# Patient Record
Sex: MALE | Race: WHITE | URBAN - METROPOLITAN AREA
[De-identification: names, ages, dates, MRNs, and addresses within clinical notes are randomized per-mention and may not be internally consistent; named-entity substitution may affect disease eponyms.]

---

## 2017-08-23 ENCOUNTER — OFFICE VISIT (OUTPATIENT)
Dept: FAMILY MEDICINE CLINIC | Age: 37
End: 2017-08-23

## 2017-08-23 VITALS
HEART RATE: 96 BPM | BODY MASS INDEX: 35.8 KG/M2 | TEMPERATURE: 98.2 F | DIASTOLIC BLOOD PRESSURE: 80 MMHG | RESPIRATION RATE: 18 BRPM | SYSTOLIC BLOOD PRESSURE: 127 MMHG | OXYGEN SATURATION: 96 % | WEIGHT: 294 LBS | HEIGHT: 76 IN

## 2017-08-23 DIAGNOSIS — E66.9 OBESITY (BMI 35.0-39.9 WITHOUT COMORBIDITY): ICD-10-CM

## 2017-08-23 DIAGNOSIS — G47.00 INSOMNIA, UNSPECIFIED TYPE: ICD-10-CM

## 2017-08-23 DIAGNOSIS — M62.838 CERVICAL PARASPINAL MUSCLE SPASM: ICD-10-CM

## 2017-08-23 DIAGNOSIS — M62.838 MUSCLE SPASM OF LEFT SHOULDER: ICD-10-CM

## 2017-08-23 DIAGNOSIS — S46.912A MUSCLE STRAIN, SHOULDER REGION, LEFT, INITIAL ENCOUNTER: Primary | ICD-10-CM

## 2017-08-23 DIAGNOSIS — E78.2 ELEVATED TRIGLYCERIDES WITH HIGH CHOLESTEROL: ICD-10-CM

## 2017-08-23 PROBLEM — G43.909 MIGRAINE WITHOUT STATUS MIGRAINOSUS, NOT INTRACTABLE: Status: ACTIVE | Noted: 2017-08-23

## 2017-08-23 RX ORDER — IBUPROFEN 200 MG
1000 TABLET ORAL
COMMUNITY
End: 2017-08-23

## 2017-08-23 RX ORDER — KETOROLAC TROMETHAMINE 30 MG/ML
30 INJECTION, SOLUTION INTRAMUSCULAR; INTRAVENOUS ONCE
Qty: 1 ML | Refills: 0 | Status: SHIPPED | COMMUNITY
Start: 2017-08-23 | End: 2017-08-23

## 2017-08-23 RX ORDER — CYCLOBENZAPRINE HCL 10 MG
TABLET ORAL
Qty: 20 TAB | Refills: 0 | Status: SHIPPED | OUTPATIENT
Start: 2017-08-23

## 2017-08-23 RX ORDER — IBUPROFEN 800 MG/1
TABLET ORAL
Qty: 90 TAB | Refills: 0 | Status: SHIPPED | OUTPATIENT
Start: 2017-08-23

## 2017-08-23 NOTE — MR AVS SNAPSHOT
Visit Information Date & Time Provider Department Dept. Phone Encounter #  
 8/23/2017  3:15 PM Brittany Campos MD [address] [facility] [phone] [id number] Upcoming Health Maintenance Date Due DTaP/Tdap/Td series (1 - Tdap) 5/21/2001 INFLUENZA AGE 9 TO ADULT 8/1/2017 Allergies as of 8/23/2017  Review Complete On: 8/23/2017 By: Brittany Campos MD  
  
 Severity Noted Reaction Type Reactions Fenofibrate  08/23/2017   Intolerance Other (comments) Low energy after 2-3 weeks Current Immunizations  Never Reviewed No immunizations on file. Not reviewed this visit You Were Diagnosed With   
  
 Codes Comments Muscle strain, shoulder region, left, initial encounter    -  Primary ICD-10-CM: P51.777H ICD-9-CM: 840.9 Muscle spasm of left shoulder     ICD-10-CM: E24.695 ICD-9-CM: 728.85 Cervical paraspinal muscle spasm     ICD-10-CM: D13.197 ICD-9-CM: 728.85 Elevated triglycerides with high cholesterol     ICD-10-CM: E78.2 ICD-9-CM: 272.2 Insomnia, unspecified type     ICD-10-CM: G47.00 ICD-9-CM: 780.52 Obesity (BMI 35.0-39.9 without comorbidity)     ICD-10-CM: J37.2 ICD-9-CM: 278.00 Vitals BP Pulse Temp Resp Height(growth percentile) Weight(growth percentile) 127/80 96 98.2 °F (36.8 °C) 18 6' 4\" (1.93 m) 294 lb (133.4 kg) SpO2 BMI Smoking Status 96% 35.79 kg/m2 Former Smoker Vitals History BMI and BSA Data Body Mass Index Body Surface Area 35.79 kg/m 2 2.67 m 2 Preferred Pharmacy Pharmacy Name Phone [facility] [phone], [address] [phone] Your Updated Medication List  
  
   
This list is accurate as of: 8/23/17  4:05 PM.  Always use your most recent med list.  
  
  
  
  
 cyclobenzaprine 10 mg tablet Commonly known as:  FLEXERIL  
 1/2 tab to 1 tab twice a day as needed for muscle spasms  
  
 ibuprofen 800 mg tablet Commonly known as:  MOTRIN  
1 tab PO every 8 hrs with food for 7 days then take 1 tab PO every 8 hrs as needed  
  
 ketorolac tromethamine 60 mg/2 mL Soln Commonly known as:  TORADOL  
1 mL by IntraMUSCular route once for 1 dose. Prescriptions Sent to Pharmacy Refills  
 cyclobenzaprine (FLEXERIL) 10 mg tablet 0 Si/2 tab to 1 tab twice a day as needed for muscle spasms Class: Normal  
 Pharmacy: [facility] Ph #: [phone]  
 ibuprofen (MOTRIN) 800 mg tablet 0 Si tab PO every 8 hrs with food for 7 days then take 1 tab PO every 8 hrs as needed Class: Normal  
 Pharmacy: [facility] [address] [place] [address] Rd, [address] Ph #: [phone] We Performed the Following KETOROLAC TROMETHAMINE INJ [ [facility]] NE THER/PROPH/DIAG INJECTION, SUBCUT/IM [id number] CPT(R)] To-Do List   
 2017 Lab:  HEMOGLOBIN A1C WITH EAG   
  
 2017 Lab:  HGB & HCT   
  
 2017 Lab:  LIPID PANEL   
  
 2017 Lab:  METABOLIC PANEL, COMPREHENSIVE Patient Instructions Take ibuprofen and flexeril as prescribed Do not drive drowsy or mix flexeril with alcohol or other sedatives Take ibuprofen with food every time, start tomorrow Do shoulder and neck exercises as often as possible daily If this is not helpful in the next 6-8 weeks we can refer you to physical therapy For sleep, non caffiene after noon each day Consider stretching at night Return to give blood work after fasting at least 8 hrs, water is okay Lab opens at 8:30 AM 
We are rechecking your cholesterol and other basic blood test 
We will let you know the results of your blood and urine test when they come in. We will call if abnormal and send a letter if normal. 
 
Neck Spasm: Exercises Your Care Instructions Here are some examples of typical rehabilitation exercises for your condition. Start each exercise slowly. Ease off the exercise if you start to have pain. Your doctor or physical therapist will tell you when you can start these exercises and which ones will work best for you. How to do the exercises Levator scapula stretch 1. Sit in a firm chair, or stand up straight. 2. Gently tilt your head toward your left shoulder. 3. Turn your head to look down into your armpit, bending your head slightly forward. Let the weight of your head stretch your neck muscles. 4. Hold for 15 to 30 seconds. 5. Return to your starting position. 6. Follow the same instructions above, but tilt your head toward your right shoulder. 7. Repeat 2 to 4 times toward each shoulder. Upper trapezius stretch 1. Sit in a firm chair, or stand up straight. 2. This stretch works best if you keep your shoulder down as you lean away from it. To help you remember to do this, start by relaxing your shoulders and lightly holding on to your thighs or your chair. 3. Tilt your head toward your shoulder and hold for 15 to 30 seconds. Let the weight of your head stretch your muscles. 4. If you would like a little added stretch, place your arm behind your back. Use the arm opposite of the direction you are tilting your head. For example, if you are tilting your head to the left, place your right arm behind your back. 5. Repeat 2 to 4 times toward each shoulder. Neck rotation 1. Sit in a firm chair, or stand up straight. 2. Keeping your chin level, turn your head to the right, and hold for 15 to 30 seconds. 3. Turn your head to the left, and hold for 15 to 30 seconds. 4. Repeat 2 to 4 times to each side. Chin tuck 1. Lie on the floor with a rolled-up towel under your neck. Your head should be touching the floor. 2. Slowly bring your chin toward the front of your neck. 3. Hold for a count of 6, and then relax for up to 10 seconds. 4. Repeat 8 to 12 times. Forward neck flexion 1. Sit in a firm chair, or stand up straight. 2. Bend your head forward. 3. Hold for 15 to 30 seconds, then return to your starting position. 4. Repeat 2 to 4 times. Follow-up care is a key part of your treatment and safety. Be sure to make and go to all appointments, and call your doctor if you are having problems. It's also a good idea to know your test results and keep a list of the medicines you take. Where can you learn more? Go to http://jessica-demond.info/. Enter P962 in the search box to learn more about \"Neck Spasm: Exercises. \" Current as of: March 21, 2017 Content Version: 11.3 © 2345-7535 Spodly. Care instructions adapted under license by [employer]pt[employer] (which disclaims liability or warranty for this information). If you have questions about a medical condition or this instruction, always ask your healthcare professional. [place] [address] any warranty or liability for your use of this information. Shoulder Blade: Exercises Your Care Instructions Here are some examples of typical exercises for your condition. Start each exercise slowly. Ease off the exercise if you start to have pain. Your doctor or physical therapist will tell you when you can start these exercises and which ones will work best for you. How to do the exercises Shoulder roll 8. Stand tall with your chin slightly tucked. Imagine that a string at the top of your head is pulling you straight up. 9. Keep your arms relaxed. All motion will be in your shoulders. 10. Shrug your shoulders up toward your ears, then up and back. [place] your shoulders down and back, like you're sliding your hands down into your back pants pockets. 11. Repeat the circles at least 2 to 4 times. This exercise is also helpful anytime you want to relax. Lower neck and upper back stretch 6. With your arms about shoulder height, clasp your hands in front of you. 7. Drop your chin toward your chest. 
8. Reach straight forward so you are rounding your upper back. Think about pulling your shoulder blades apart. Javier Orozco feel a stretch across your upper back and shoulders. Hold for at least 6 seconds. 9. Repeat 2 to 4 times. Triceps stretch 5. Reach your arm straight up. 6. Keeping your elbow in place, bend your arm and reach your hand down behind your back. 7. With your other hand, apply gentle pressure to the bent elbow. Willouis Orozco feel a stretch at the back of your upper arm and shoulder. Hold about 6 seconds. 8. Repeat 2 to 4 times with each arm. Shoulder stretch 5. Relax your shoulders. 6. Raise one arm to shoulder height, and reach it across your chest. 
7. Pull the arm slightly toward you with your other arm. This will help you get a gentle stretch. Hold for about 6 seconds. 8. Repeat 2 to 4 times. Shoulder blade squeeze 5. Sit or stand up tall with your arms at your sides. 6. Keep your shoulders relaxed and down, not shrugged. 7. Squeeze your shoulder blades together. Hold for 6 seconds, then relax. 8. Repeat 8 to 12 times. Straight-arm shoulder blade squeeze 1. Sit or stand tall. Relax your shoulders. 2. With palms down, hold your elastic tubing or band straight out in front of you. 3. Start with slight tension in the tubing or band, with your hands about shoulder-width apart. 4. Slowly pull straight out to the sides, squeezing your shoulder blades together. Keep your arms straight and at shoulder height. Slowly release. 5. Repeat 8 to 12 times. Rowing 1. [place] your elastic tubing or band at about waist height. Take one end in each hand. 2. Sit or stand with your feet hip-width apart. 3. Hold your arms straight in front of you.  Adjust your distance to create slight tension in the tubing or band. 4. Slightly tuck your chin. Relax your shoulders. 5. Without shrugging your shoulders, pull straight back. Your elbows will pass alongside your waist. 
Pull-downs 1. [place] your elastic tubing or band in the top of a closed door. Take one end in each hand. 2. Either sit or stand, depending on what is more comfortable. If you feel unsteady, sit on a chair. 3. Start with your arms up and comfortably apart, elbows straight. There should be a slight tension in the tubing or band. 4. Slightly tuck your chin, and look straight ahead. 5. Keeping your back straight, slowly pull down and back, bending your elbows. 6. Stop where your hands are level with your chin, in a \"goalpost\" position. 7. Repeat 8 to 12 times. Chest T stretch 1. Lie on your back. Raise your knees so they are bent. Plant your feet on the floor, hip-width apart. 2. Tuck your chin, and relax your shoulders. 3. Reach your arms straight out to the sides. If you don't feel a mild stretch in your shoulders and across your chest, use a foam roll or a tightly rolled blanket under your spine, from your tailbone to your head. 4. Relax in this position for at least 15 to 30 seconds while you breathe normally. Repeat 2 to 4 times. As you get used to this stretch, keep adding a little more time until you are able relax in this position for 2 or 3 minutes. When you can relax for at least 2 minutes, you only need to do the exercise 1 time per session. Chest goalpost stretch 1. Lie on your back. Raise your knees so they are bent. Plant your feet on the floor, hip-width apart. 2. Tuck your chin, and relax your shoulders. 3. Reach your arms straight out to the sides. 4. Bend your arms at the elbows, with your hands pointed toward the top of your head. Your arms should make an L on either side of your head. Your palms should be facing up.  
5. If you don't feel a mild stretch in your shoulders and across your chest, use a foam roll or tightly rolled blanket under your spine, from your tailbone to your head. 6. Relax in this position for at least 15 to 30 seconds while you breathe normally. Repeat 2 to 4 times. Each day you do this exercise, add a little more time until you can relax in this position for 2 or 3 minutes. When you can relax for at least 2 minutes, you only need to do the exercise 1 time per session. Follow-up care is a key part of your treatment and safety. Be sure to make and go to all appointments, and call your doctor if you are having problems. It's also a good idea to know your test results and keep a list of the medicines you take. Where can you learn more? Go to http://jessica-demond.info/. Enter ([phone] [phone] in the search box to learn more about \"Shoulder Blade: Exercises. \" Current as of: March 21, 2017 Content Version: 11.3 © 8313-1054 Emote Games. Care instructions adapted under license by Embedly (which disclaims liability or warranty for this information). If you have questions about a medical condition or this instruction, always ask your healthcare professional. [address] any warranty or liability for your use of this information. Introducing [facility]! Kamilah Garza introduces [email or web address] patient portal. Now you can access parts of your medical record, email your doctor's office, and request medication refills online. 1. In your internet browser, go to [email or web address] 2. Click on the First Time User? Click Here link in the Sign In box. You will see the New Member Sign Up page. 3. Enter your [email or web address] Access Code exactly as it appears below. You will not need to use this code after youve completed the sign-up process. If you do not sign up before the expiration date, you must request a new code. · [email or web address] Access Code: [email or web address] Expires: 11/21/2017  3:14 PM 
 
 4. Enter the last four digits of your Social Security Number (xxxx) and Date of Birth (mm/dd/yyyy) as indicated and click Submit. You will be taken to the next sign-up page. 5. Create a Peekapak ID. This will be your Peekapak login ID and cannot be changed, so think of one that is secure and easy to remember. 6. Create a Peekapak password. You can change your password at any time. 7. Enter your Password Reset Question and Answer. This can be used at a later time if you forget your password. 8. Enter your e-mail address. You will receive e-mail notification when new information is available in [address] 9. Click Sign Up. You can now view and download portions of your medical record. 10. Click the Download Summary menu link to download a portable copy of your medical information. If you have questions, please visit the Frequently Asked Questions section of the Peekapak website. Remember, Peekapak is NOT to be used for urgent needs. For medical emergencies, dial 911. Now available from your iPhone and Android! Please provide this summary of care documentation to your next provider. If you have any questions after today's visit, please call [phone].

## 2017-08-23 NOTE — PROGRESS NOTES
INTERNAL MEDICINE INITIAL PROVIDER VISIT    SUBJECTIVE:     Chief Complaint   Patient presents with    Shoulder Pain     left shoulder pain times 2 and a half months. Patients states Jayden Rebolledo is a  and was pulling on a breaker bar and hurt his shoulder\".  Establish Care       HPI: 40 y.o. male is here for the above chief complaint(s). Establish Care: No prior PCP in years. Left Shoulder Pain: Onset 2 months ago while working as  pulling and had sharp/tightness pain near shoulder blade/superior shoulder and neck. Worse with movement. Better with ibuprofen. Doing lighter work at [address] hot patches somewhat help. No weakness, numbness or tingling coming down arm. No similar pains. No previous work up. [employer] in [employer]. Migraines: Brings up at end of visit. On average 2x per month. Encouraged to schedule appointment to address. Pt voiced understanding. ROS:  · General: negative for - chills, fever, weight changes or malaise, night sweats. · HEENT: negative for- sore throat, nasal congestion,  vision problems or ear problems. H/o sinus congestion, not currently. · Neck: negative for- neck swelling, trouble swallowing, lymph node enlargement. · Respiratory: negative for- cough, shortness of breath, or wheezing. · Cardiovascular: negative for- chest pain, palpitations, or dyspnea on exertion; no orthopnea or PND. · Gastrointestinal: negative for- abdominal pain, N/V, change in bowel habits,  black or bloody stools, constipation or diarrhea. · Genitourinary: negative for- dysuria, hematuria, frequency, urgency, nocturia, incontinence. · Skin: negative for- new rashes or lesions. Positive for Skin tags. · Hematology: negative for- easy bruising or bleeding. · Musculoskeletal: As per HPI. Occasional Low back pain  · Neurological: negative for- numbness, tingling, headache or dizziness. Positive h/o migraines.    · Psychological: negative for - anxiety, depression, sleep disturbances, suicidal or homicidal ideations. Trouble falling asleep, shutting brain off at night. Current Outpatient Prescriptions   Medication Sig    cyclobenzaprine (FLEXERIL) 10 mg tablet 1/2 tab to 1 tab twice a day as needed for muscle spasms    ibuprofen (MOTRIN) 800 mg tablet 1 tab PO every 8 hrs with food for 7 days then take 1 tab PO every 8 hrs as needed    ketorolac tromethamine (TORADOL) 60 mg/2 mL soln 1 mL by IntraMUSCular route once for 1 dose. No current facility-administered medications for this visit. Health Maintenance   Topic Date Due    DTaP/Tdap/Td series (1 - Tdap) 05/21/2001    INFLUENZA AGE 9 TO ADULT  08/01/2017       Medications and Allergies: Reviewed and confirmed in the chart    Past Medical Hx: Reviewed and confirmed in the chart  Past Medical History:   Diagnosis Date    Elevated triglycerides with high cholesterol 2820-6120    500-600    Insomnia     years, trouble falling asleep, related to ruminations, trouble staying asleep    Migraines     childhood, on average every 2 weeks    Obesity (BMI 35.0-39.9 without comorbidity)     Right foot pain     15 years ago stepped awkward heard a pop       Family Hx, Surgical Hx, Social Hx: Reviewed and updated in EMR    OBJECTIVE:  Vitals:    08/23/17 1500 08/23/17 1605   BP: (!) 141/91 127/80   Pulse: 96    Resp: 18    Temp: 98.2 °F (36.8 °C)    SpO2: 96%    Weight: 294 lb (133.4 kg)    Height: 6' 4\" (1.93 m)    General: Adult male in no acute distress  HEENT: Head is atraumatic normo-cephalic. Pupils equally round and reactive to light, extraocular muscle intact, conjunctiva clear, non-icterus. CVS:Heart regular, rate, and rhythm. Audible S1 and S2. No murmurs, rubs or gallops. Peripheral pulses 2+   PULM: Lungs clear to auscultation bilaterally. No wheezes, rales or rhonchi. Neuro: Alert and oriented x 3. MSE: euthymic, affect congruent. Left Shoulder and Neck : CALEB, strenght intact. Reflexes intact. Sensation intact. No TTP to bony landmarks. Negative drop arm. TTP with palpable spasms marybeth-cerivcal spine on left, medial marybeth-shoulder scapula and superior shoulder in trapezius distribution  Right Shoulder:  wnl       Nursing Notes Reviewed    ASSESSMENT AND PLAN    ICD-10-CM ICD-9-CM    1. Muscle strain, shoulder region, left, initial encounter S46.912A 840.9 cyclobenzaprine (FLEXERIL) 10 mg tablet      ibuprofen (MOTRIN) 800 mg tablet      ketorolac tromethamine (TORADOL) 60 mg/2 mL soln      KETOROLAC TROMETHAMINE INJ      MI THER/PROPH/DIAG INJECTION, SUBCUT/IM  Shoulder and neck exercise hand out given  Return precautions given   2. Muscle spasm of left shoulder M62.838 728.85 cyclobenzaprine (FLEXERIL) 10 mg tablet      ibuprofen (MOTRIN) 800 mg tablet      ketorolac tromethamine (TORADOL) 60 mg/2 mL soln      KETOROLAC TROMETHAMINE INJ      MI THER/PROPH/DIAG INJECTION, SUBCUT/IM   3. Cervical paraspinal muscle spasm M62.838 728.85 cyclobenzaprine (FLEXERIL) 10 mg tablet      ibuprofen (MOTRIN) 800 mg tablet      ketorolac tromethamine (TORADOL) 60 mg/2 mL soln      KETOROLAC TROMETHAMINE INJ      MI THER/PROPH/DIAG INJECTION, SUBCUT/IM   4. Elevated triglycerides with high cholesterol E78.2 272.2 LIPID PANEL  ALONSO signed for past MD   5. Insomnia, unspecified type G47.00 780.52 Discussed sleep hygiene, relaxation techniques  Encouraged to avoid caffeine after Noon   6.  Obesity (BMI 35.0-39.9 without comorbidity) E66.9 278.00 LIPID PANEL      METABOLIC PANEL, COMPREHENSIVE      HGB & HCT      HEMOGLOBIN A1C WITH EAG       Orders Placed This Encounter    LIPID PANEL    METABOLIC PANEL, COMPREHENSIVE    HGB & HCT    HEMOGLOBIN A1C WITH EAG    KETOROLAC TROMETHAMINE INJ (Qty 4)    THER/PROPH/DIAG INJECTION, SUBCUT/IM    DISCONTD: ibuprofen (MOTRIN) 200 mg tablet    cyclobenzaprine (FLEXERIL) 10 mg tablet    ibuprofen (MOTRIN) 800 mg tablet    ketorolac tromethamine (TORADOL) 60 mg/2 mL soln Future Appointments  Date Time Provider Lion Crocketti   10/18/2017 8:00 AM Mary Anaya MD [address] [place] [address]       AVS printed and provided to patient    Assessment and plan above discussed with patient, patient voiced understanding and agreement with plan. More than 50% of this 45 min visit was spent counseling the patient face to face about obesity, muscle spasms/strain, insomnia      Mary Anaya M.D.   [address], [address] [place] [address] [phone]  [place] [phone]

## 2017-08-23 NOTE — PROGRESS NOTES
Chief Complaint   Patient presents with    Shoulder Pain     left shoulder pain times 2 and a half months. Patients states Irina Pierce is a  and was pulling on a breaker bar and hurt his shoulder\".    [employer] Establish Care

## 2017-08-23 NOTE — PATIENT INSTRUCTIONS
Take ibuprofen and flexeril as prescribed  Do not drive drowsy or mix flexeril with alcohol or other sedatives  Take ibuprofen with food every time, start tomorrow  Do shoulder and neck exercises as often as possible daily    If this is not helpful in the next 6-8 weeks we can refer you to physical therapy     For sleep, non caffiene after noon each day  Consider stretching at night    Return to give blood work after fasting at least 8 hrs, water is okay  Lab opens at 8:30 AM  We are rechecking your cholesterol and other basic blood test  We will let you know the results of your blood and urine test when they come in. We will call if abnormal and send a letter if normal.    Neck Spasm: Exercises  Your Care Instructions  Here are some examples of typical rehabilitation exercises for your condition. Start each exercise slowly. Ease off the exercise if you start to have pain. Your doctor or physical therapist will tell you when you can start these exercises and which ones will work best for you. How to do the exercises  Levator scapula stretch    1. Sit in a firm chair, or stand up straight. 2. Gently tilt your head toward your left shoulder. 3. Turn your head to look down into your armpit, bending your head slightly forward. Let the weight of your head stretch your neck muscles. 4. Hold for 15 to 30 seconds. 5. Return to your starting position. 6. Follow the same instructions above, but tilt your head toward your right shoulder. 7. Repeat 2 to 4 times toward each shoulder. Upper trapezius stretch    1. Sit in a firm chair, or stand up straight. 2. This stretch works best if you keep your shoulder down as you lean away from it. To help you remember to do this, start by relaxing your shoulders and lightly holding on to your thighs or your chair. 3. Tilt your head toward your shoulder and hold for 15 to 30 seconds. Let the weight of your head stretch your muscles.   4. If you would like a little added stretch, place your arm behind your back. Use the arm opposite of the direction you are tilting your head. For example, if you are tilting your head to the left, place your right arm behind your back. 5. Repeat 2 to 4 times toward each shoulder. Neck rotation    1. Sit in a firm chair, or stand up straight. 2. Keeping your chin level, turn your head to the right, and hold for 15 to 30 seconds. 3. Turn your head to the left, and hold for 15 to 30 seconds. 4. Repeat 2 to 4 times to each side. Chin tuck    1. Lie on the floor with a rolled-up towel under your neck. Your head should be touching the floor. 2. Slowly bring your chin toward the front of your neck. 3. Hold for a count of 6, and then relax for up to 10 seconds. 4. Repeat 8 to 12 times. Forward neck flexion    1. Sit in a firm chair, or stand up straight. 2. Bend your head forward. 3. Hold for 15 to 30 seconds, then return to your starting position. 4. Repeat 2 to 4 times. Follow-up care is a key part of your treatment and safety. Be sure to make and go to all appointments, and call your doctor if you are having problems. It's also a good idea to know your test results and keep a list of the medicines you take. Where can you learn more? Go to http://jessica-demond.info/. Enter P962 in the search box to learn more about \"Neck Spasm: Exercises. \"  Current as of: March 21, 2017  Content Version: 11.3  © 2739-7920 [employer]. Care instructions adapted under license by [employer] (which disclaims liability or warranty for this information). If you have questions about a medical condition or this instruction, always ask your healthcare professional. [address] any warranty or liability for your use of this information. Shoulder Blade: Exercises  Your Care Instructions  Here are some examples of typical exercises for your condition. Start each exercise slowly.  Ease off the exercise if you start to have pain. Your doctor or physical therapist will tell you when you can start these exercises and which ones will work best for you. How to do the exercises  Shoulder roll    8. Stand tall with your chin slightly tucked. Imagine that a string at the top of your head is pulling you straight up. 9. Keep your arms relaxed. All motion will be in your shoulders. 10. Shrug your shoulders up toward your ears, then up and back. [employer] your shoulders down and back, like you're sliding your hands down into your back pants pockets. 11. Repeat the circles at least 2 to 4 times. This exercise is also helpful anytime you want to relax. Lower neck and upper back stretch    6. With your arms about shoulder height, clasp your hands in front of you. 7. Drop your chin toward your chest.  8. Reach straight forward so you are rounding your upper back. Think about pulling your shoulder blades apart. Sally Cease feel a stretch across your upper back and shoulders. Hold for at least 6 seconds. 9. Repeat 2 to 4 times. Triceps stretch    5. Reach your arm straight up. 6. Keeping your elbow in place, bend your arm and reach your hand down behind your back. 7. With your other hand, apply gentle pressure to the bent elbow. Sally Cease feel a stretch at the back of your upper arm and shoulder. Hold about 6 seconds. 8. Repeat 2 to 4 times with each arm. Shoulder stretch    5. Relax your shoulders. 6. Raise one arm to shoulder height, and reach it across your chest.  7. Pull the arm slightly toward you with your other arm. This will help you get a gentle stretch. Hold for about 6 seconds. 8. Repeat 2 to 4 times. Shoulder blade squeeze    5. Sit or stand up tall with your arms at your sides. 6. Keep your shoulders relaxed and down, not shrugged. 7. Squeeze your shoulder blades together. Hold for 6 seconds, then relax. 8. Repeat 8 to 12 times. Straight-arm shoulder blade squeeze    1. Sit or stand tall.  Relax your shoulders. 2. With palms down, hold your elastic tubing or band straight out in front of you. 3. Start with slight tension in the tubing or band, with your hands about shoulder-width apart. 4. Slowly pull straight out to the sides, squeezing your shoulder blades together. Keep your arms straight and at shoulder height. Slowly release. 5. Repeat 8 to 12 times. Rowing    1. Sesser your elastic tubing or band at about waist height. Take one end in each hand. 2. Sit or stand with your feet hip-width apart. 3. Hold your arms straight in front of you. Adjust your distance to create slight tension in the tubing or band. 4. Slightly tuck your chin. Relax your shoulders. 5. Without shrugging your shoulders, pull straight back. Your elbows will pass alongside your waist.  Pull-downs    1. Sesser your elastic tubing or band in the top of a closed door. Take one end in each hand. 2. Either sit or stand, depending on what is more comfortable. If you feel unsteady, sit on a chair. 3. Start with your arms up and comfortably apart, elbows straight. There should be a slight tension in the tubing or band. 4. Slightly tuck your chin, and look straight ahead. 5. Keeping your back straight, slowly pull down and back, bending your elbows. 6. Stop where your hands are level with your chin, in a \"goalpost\" position. 7. Repeat 8 to 12 times. Chest T stretch    1. Lie on your back. Raise your knees so they are bent. Plant your feet on the floor, hip-width apart. 2. Tuck your chin, and relax your shoulders. 3. Reach your arms straight out to the sides. If you don't feel a mild stretch in your shoulders and across your chest, use a foam roll or a tightly rolled blanket under your spine, from your tailbone to your head. 4. Relax in this position for at least 15 to 30 seconds while you breathe normally. Repeat 2 to 4 times.   As you get used to this stretch, keep adding a little more time until you are able relax in this position for 2 or 3 minutes. When you can relax for at least 2 minutes, you only need to do the exercise 1 time per session. Chest goalpost stretch    1. Lie on your back. Raise your knees so they are bent. Plant your feet on the floor, hip-width apart. 2. Tuck your chin, and relax your shoulders. 3. Reach your arms straight out to the sides. 4. Bend your arms at the elbows, with your hands pointed toward the top of your head. Your arms should make an L on either side of your head. Your palms should be facing up. 5. If you don't feel a mild stretch in your shoulders and across your chest, use a foam roll or tightly rolled blanket under your spine, from your tailbone to your head. 6. Relax in this position for at least 15 to 30 seconds while you breathe normally. Repeat 2 to 4 times. Each day you do this exercise, add a little more time until you can relax in this position for 2 or 3 minutes. When you can relax for at least 2 minutes, you only need to do the exercise 1 time per session. Follow-up care is a key part of your treatment and safety. Be sure to make and go to all appointments, and call your doctor if you are having problems. It's also a good idea to know your test results and keep a list of the medicines you take. Where can you learn more? Go to http://jessica-demond.info/. Enter [phone] in the search box to learn more about \"Shoulder Blade: Exercises. \"  Current as of: March 21, 2017  Content Version: 11.3  © 8156-2996 [employer]. Care instructions adapted under license by [employer] (which disclaims liability or warranty for this information). If you have questions about a medical condition or this instruction, always ask your healthcare professional. [address] any warranty or liability for your use of this information.

## 2017-09-08 PROBLEM — G56.01 CARPAL TUNNEL SYNDROME OF RIGHT WRIST: Status: ACTIVE | Noted: 2017-09-08

## 2017-09-08 PROBLEM — M72.2 PLANTAR FASCIITIS OF RIGHT FOOT: Status: ACTIVE | Noted: 2017-09-08

## 2022-03-18 PROBLEM — G56.01 CARPAL TUNNEL SYNDROME OF RIGHT WRIST: Status: ACTIVE | Noted: 2017-09-08

## 2022-03-20 PROBLEM — M72.2 PLANTAR FASCIITIS OF RIGHT FOOT: Status: ACTIVE | Noted: 2017-09-08

## 2023-01-05 ENCOUNTER — OFFICE VISIT (OUTPATIENT)
Dept: FAMILY MEDICINE CLINIC | Age: 43
End: 2023-01-05
Payer: MEDICAID

## 2023-01-05 VITALS
DIASTOLIC BLOOD PRESSURE: 95 MMHG | WEIGHT: 315 LBS | HEART RATE: 86 BPM | RESPIRATION RATE: 18 BRPM | HEIGHT: 76 IN | OXYGEN SATURATION: 96 % | TEMPERATURE: 97.7 F | BODY MASS INDEX: 38.36 KG/M2 | SYSTOLIC BLOOD PRESSURE: 133 MMHG

## 2023-01-05 DIAGNOSIS — E66.01 CLASS 2 SEVERE OBESITY DUE TO EXCESS CALORIES WITH SERIOUS COMORBIDITY AND BODY MASS INDEX (BMI) OF 39.0 TO 39.9 IN ADULT (HCC): ICD-10-CM

## 2023-01-05 DIAGNOSIS — Z13.220 SCREENING FOR HYPERLIPIDEMIA: ICD-10-CM

## 2023-01-05 DIAGNOSIS — Z13.1 SCREENING FOR DIABETES MELLITUS: ICD-10-CM

## 2023-01-05 DIAGNOSIS — E78.5 HYPERLIPIDEMIA, UNSPECIFIED HYPERLIPIDEMIA TYPE: ICD-10-CM

## 2023-01-05 DIAGNOSIS — I10 HYPERTENSION, UNSPECIFIED TYPE: ICD-10-CM

## 2023-01-05 DIAGNOSIS — Z00.00 ENCOUNTER FOR ANNUAL PHYSICAL EXAM: Primary | ICD-10-CM

## 2023-01-05 DIAGNOSIS — Z11.59 NEED FOR HEPATITIS C SCREENING TEST: ICD-10-CM

## 2023-01-05 PROBLEM — E66.812 CLASS 2 SEVERE OBESITY DUE TO EXCESS CALORIES WITH SERIOUS COMORBIDITY AND BODY MASS INDEX (BMI) OF 39.0 TO 39.9 IN ADULT: Status: ACTIVE | Noted: 2023-01-05

## 2023-01-05 PROCEDURE — 99204 OFFICE O/P NEW MOD 45 MIN: CPT | Performed by: STUDENT IN AN ORGANIZED HEALTH CARE EDUCATION/TRAINING PROGRAM

## 2023-01-05 PROCEDURE — 3080F DIAST BP >= 90 MM HG: CPT | Performed by: STUDENT IN AN ORGANIZED HEALTH CARE EDUCATION/TRAINING PROGRAM

## 2023-01-05 PROCEDURE — 99386 PREV VISIT NEW AGE 40-64: CPT | Performed by: STUDENT IN AN ORGANIZED HEALTH CARE EDUCATION/TRAINING PROGRAM

## 2023-01-05 PROCEDURE — 3075F SYST BP GE 130 - 139MM HG: CPT | Performed by: STUDENT IN AN ORGANIZED HEALTH CARE EDUCATION/TRAINING PROGRAM

## 2023-01-05 RX ORDER — ATORVASTATIN CALCIUM 40 MG/1
40 TABLET, FILM COATED ORAL DAILY
Qty: 90 TABLET | Refills: 0 | Status: SHIPPED | OUTPATIENT
Start: 2023-01-05 | End: 2023-04-05

## 2023-01-05 RX ORDER — LISINOPRIL 10 MG/1
10 TABLET ORAL DAILY
Qty: 90 TABLET | Refills: 0 | Status: SHIPPED | OUTPATIENT
Start: 2023-01-05 | End: 2023-04-05

## 2023-01-05 NOTE — PROGRESS NOTES
1. \"Have you been to the ER, urgent care clinic since your last visit? Hospitalized since your last visit? \" No    2. \"Have you seen or consulted any other health care providers outside of the [address] [place] [address] since your last visit? \" Yes When: 2/3/2022 Where: [facility]  Reason for visit: diabetes      3. For patients aged 39-70: Has the patient had a colonoscopy / FIT/ Cologuard?  NA - based on age

## 2023-01-05 NOTE — PROGRESS NOTES
Subjective:     Lalita Rachel is a 43 y.o. male presenting for annual exam and complete physical.    Patient Active Problem List   Diagnosis Code    Elevated triglycerides with high cholesterol E78.2    Right foot pain M79.671    Migraines G43.909    Insomnia G47.00    Obesity (BMI 35.0-39.9 without comorbidity) E66.9    Plantar fasciitis of right foot M72.2    Carpal tunnel syndrome of right wrist G56.01    Class 2 severe obesity due to excess calories with serious comorbidity and body mass index (BMI) of 39.0 to 39.9 in adult ([facility] [address] E66.01, Z68.39    Hypertension I10     Patient Active Problem List    Diagnosis Date Noted    Class 2 severe obesity due to excess calories with serious comorbidity and body mass index (BMI) of 39.0 to 39.9 in adult [facility]) 01/05/2023    Hypertension 01/05/2023    Plantar fasciitis of right foot 09/08/2017    Carpal tunnel syndrome of right wrist 09/08/2017    Elevated triglycerides with high cholesterol     Right foot pain     Migraines     Insomnia     Obesity (BMI 35.0-39.9 without comorbidity)      Current Outpatient Medications   Medication Sig Dispense Refill    semaglutide (OZEMPIC) 0.25 mg or 0.5 mg/dose (2 mg/1.5 ml) subq pen 0.25 mg weekly x4 weeks, then 0.5 mg weekly x4 weeks, then 1 mg weekly x4 weeks  Indications: weight loss management for an obese person 1 Box 1    lisinopriL (PRINIVIL, ZESTRIL) 10 mg tablet Take 1 Tablet by mouth daily for 90 days. 90 Tablet 0    atorvastatin (LIPITOR) 40 mg tablet Take 1 Tablet by mouth daily for 90 days.  90 Tablet 0    cyclobenzaprine (FLEXERIL) 10 mg tablet 1/2 tab to 1 tab twice a day as needed for muscle spasms 20 Tab 0    ibuprofen (MOTRIN) 800 mg tablet 1 tab PO every 8 hrs with food for 7 days then take 1 tab PO every 8 hrs as needed 90 Tab 0     Allergies   Allergen Reactions    Fenofibrate Other (comments)     Low energy after 2-3 weeks     Past Medical History:   Diagnosis Date    Elevated triglycerides with high cholesterol 0423-0020    500-600    H/O fracture of foot     right 4th5th metatarsal    Insomnia     years, trouble falling asleep, related to ruminations, trouble staying asleep    Migraines     childhood, on average every 2 weeks    Obesity (BMI 35.0-39.9 without comorbidity)     Right foot pain     15 years ago stepped awkward heard a pop     Past Surgical History:   Procedure Laterality Date    HX APPENDECTOMY       Family History   Problem Relation Age of Onset    Hypertension Father     Heart Disease Father     Diabetes Father     High Cholesterol Father     High Cholesterol Maternal Grandfather     Heart Disease Maternal Grandfather     Diabetes Maternal Grandfather     Heart Disease Paternal Grandmother     Hypertension Paternal Grandmother     Diabetes Paternal Grandmother     High Cholesterol Paternal Grandmother     Stroke Paternal Grandfather     Heart Disease Paternal Grandfather      Social History     Tobacco Use    Smoking status: Former     Types: Cigarettes     Quit date: 3/1/2017     Years since quittin.8    Smokeless tobacco: Never    Tobacco comments:     started at [age], max 1 ppd, , off and on.  Down to 3 mg nicotine vaporizing   Substance Use Topics    Alcohol use: Yes     Comment: social drinker, once a week 2-3 per session          Review of Systems  Constitutional: negative  Eyes: negative  Ears, nose, mouth, throat, and face: negative  Respiratory: negative  Cardiovascular: negative  Gastrointestinal: negative  Genitourinary:negative  Integument/breast: negative  Hematologic/lymphatic: negative  Musculoskeletal:negative  Neurological: negative  Behavioral/Psych: negative  Endocrine: negative  Allergic/Immunologic: negative    Objective:     Visit Vitals  BP (!) 133/95 (BP 1 Location: Left upper arm, BP Patient Position: Sitting, BP Cuff Size: Large adult)   Pulse 86   Temp 97.7 °F (36.5 °C) (Temporal)   Resp 18   Ht 6' 4\" (1.93 m)   Wt 320 lb 6.4 oz (145.3 kg)   SpO2 96%   BMI 39.00 kg/m² Physical exam:   General appearance - alert, well appearing, and in no distress, acyanotic, in no respiratory distress, well hydrated, and obese  Mental status - alert, oriented to person, place, and time  Eyes - pupils equal and reactive, extraocular eye movements intact, sclera anicteric, left eye normal, right eye normal  Ears - bilateral TM's and external ear canals normal, right ear normal, left ear normal  Nose - normal and patent, no erythema, discharge or polyps  Mouth - mucous membranes moist, pharynx normal without lesions  Neck - supple, no significant adenopathy  Lymphatics - no palpable lymphadenopathy, no hepatosplenomegaly  Chest - clear to auscultation, no wheezes, rales or rhonchi, symmetric air entry  Heart - normal rate, regular rhythm, normal S1, S2, no murmurs, rubs, clicks or gallops  Abdomen - soft, nontender, nondistended, no masses or organomegaly  bowel sounds normal  Back exam - full range of motion, no tenderness, palpable spasm or pain on motion  Neurological - alert, oriented, normal speech, no focal findings or movement disorder noted  Musculoskeletal - no joint tenderness, deformity or swelling  Extremities - peripheral pulses normal, no pedal edema, no clubbing or cyanosis  Skin - normal coloration and turgor, no rashes, no suspicious skin lesions noted     Assessment/Plan:     Diagnoses and all orders for this visit:    1. Encounter for annual physical exam  Comments:  49-year-old male presenting today to establish care and for CPE. Patient denies any acute medical issues and appears in stable health  Orders:  -     CBC W/O DIFF; Future  -     METABOLIC PANEL, COMPREHENSIVE; Future    2. Class 2 severe obesity due to excess calories with serious comorbidity and body mass index (BMI) of 39.0 to 39.9 in adult [facility])  Patient's BMI is noted to be 39.00  It is recommended that you complete aerobic exercise 30-40 minutes a day, at least 3-5 days a week.  Aerobic exercises are activities such as brisk walking, running/jogging, dancing, biking, swimming. Patient has also been encouraged to join a gym  Healthy diet: Eat real food! Avoid or minimize all processed food. Plan is to follow-up in 3 months  Plan is acceptable to patient. -     semaglutide (OZEMPIC) 0.25 mg or 0.5 mg/dose (2 mg/1.5 ml) subq pen; 0.25 mg weekly x4 weeks, then 0.5 mg weekly x4 weeks, then 1 mg weekly x4 weeks  Indications: weight loss management for an obese person    3. Hypertension, unspecified type  Pleasant 55-year-old male with past medical history of hypertension previously on losartan hydrochlorothiazide but has been off medication for a while. Patient blood pressure is currently 133/95  Will begin patient on lisinopril 10 mg daily. Patient will be reassessed during follow-up to assess for blood pressure control. Counseled patient on the need for some modifications to diet and exercise. DASH diet handout printed for patient. Patient at this time denies any acute concerns and appears in stable health. -     lisinopriL (PRINIVIL, ZESTRIL) 10 mg tablet; Take 1 Tablet by mouth daily for 90 days. 4. Hyperlipidemia, unspecified hyperlipidemia type  Patient is presented with a past medical history of hyperlipidemia  Most recent lipid panel of 10/15/2021 notable for total cholesterol 231, , HDL 29 LDL Calc 174  Patient is currently on not currently on any medication. Plan is to begin Lipitor 40 mg daily and reassess need for ezetimibe status post updated lipid panel. Patient counseled on benefits of lifestyle modifications through diet and exercise  Printout of recommendations for lifestyle modifications given to patient's. Will follow-up repeat lipid panel in 6 months   -     atorvastatin (LIPITOR) 40 mg tablet; Take 1 Tablet by mouth daily for 90 days. 5. Need for hepatitis C screening test  Comments:  Per care gap  Orders:  -     HEPATITIS C AB; Future    6.  Uncontrolled type 2 diabetes mellitus with hyperglycemia ([facility] [address]    His most recent A1c is 9.8. Encouraged to be consistent with her medications and lifestyle modifications through dietary changes and exercise. Patient has been encouraged to check her blood sugar twice daily. Patient will continue with Ozempic and metformin.   Patient has been advised to call back immediately concerns       Patient will return in 3 months for follow-up obesity, HTN and DM

## 2023-01-06 ENCOUNTER — TELEPHONE (OUTPATIENT)
Dept: FAMILY MEDICINE CLINIC | Age: 43
End: 2023-01-06

## 2023-01-06 DIAGNOSIS — E78.5 HYPERLIPIDEMIA, UNSPECIFIED HYPERLIPIDEMIA TYPE: Primary | ICD-10-CM

## 2023-01-06 DIAGNOSIS — E11.65 UNCONTROLLED TYPE 2 DIABETES MELLITUS WITH HYPERGLYCEMIA (HCC): ICD-10-CM

## 2023-01-06 LAB
ALBUMIN SERPL-MCNC: 4.3 G/DL (ref 4–5)
ALBUMIN/GLOB SERPL: 1.9 {RATIO} (ref 1.2–2.2)
ALP SERPL-CCNC: 84 IU/L (ref 44–121)
ALT SERPL-CCNC: 75 IU/L (ref 0–44)
AST SERPL-CCNC: 32 IU/L (ref 0–40)
BILIRUB SERPL-MCNC: 0.4 MG/DL (ref 0–1.2)
BUN SERPL-MCNC: 16 MG/DL (ref 6–24)
BUN/CREAT SERPL: 20 (ref 9–20)
CALCIUM SERPL-MCNC: 9.4 MG/DL (ref 8.7–10.2)
CHLORIDE SERPL-SCNC: 101 MMOL/L (ref 96–106)
CHOLEST SERPL-MCNC: 246 MG/DL (ref 100–199)
CO2 SERPL-SCNC: 24 MMOL/L (ref 20–29)
CREAT SERPL-MCNC: 0.82 MG/DL (ref 0.76–1.27)
EGFR: 112 ML/MIN/1.73
ERYTHROCYTE [DISTWIDTH] IN BLOOD BY AUTOMATED COUNT: 13 % (ref 11.6–15.4)
EST. AVERAGE GLUCOSE BLD GHB EST-MCNC: 235 MG/DL
GLOBULIN SER CALC-MCNC: 2.3 G/DL (ref 1.5–4.5)
GLUCOSE SERPL-MCNC: 196 MG/DL (ref 70–99)
HBA1C MFR BLD: 9.8 % (ref 4.8–5.6)
HCT VFR BLD AUTO: 45.6 % (ref 37.5–51)
HCV AB S/CO SERPL IA: <0.1 S/CO RATIO (ref 0–0.9)
HDLC SERPL-MCNC: 25 MG/DL
HGB BLD-MCNC: 15.5 G/DL (ref 13–17.7)
LDLC SERPL CALC-MCNC: 100 MG/DL (ref 0–99)
MCH RBC QN AUTO: 29 PG (ref 26.6–33)
MCHC RBC AUTO-ENTMCNC: 34 G/DL (ref 31.5–35.7)
MCV RBC AUTO: 85 FL (ref 79–97)
PLATELET # BLD AUTO: 207 X10E3/UL (ref 150–450)
POTASSIUM SERPL-SCNC: 4.1 MMOL/L (ref 3.5–5.2)
PROT SERPL-MCNC: 6.6 G/DL (ref 6–8.5)
RBC # BLD AUTO: 5.34 X10E6/UL (ref 4.14–5.8)
SODIUM SERPL-SCNC: 138 MMOL/L (ref 134–144)
TRIGL SERPL-MCNC: 706 MG/DL (ref 0–149)
VLDLC SERPL CALC-MCNC: 121 MG/DL (ref 5–40)
WBC # BLD AUTO: 8 X10E3/UL (ref 3.4–10.8)

## 2023-01-06 RX ORDER — METFORMIN HYDROCHLORIDE 1000 MG/1
TABLET ORAL
Qty: 180 TABLET | Refills: 0 | Status: SHIPPED | OUTPATIENT
Start: 2023-01-06 | End: 2023-01-09 | Stop reason: SDUPTHER

## 2023-01-06 RX ORDER — EZETIMIBE 10 MG/1
10 TABLET ORAL DAILY
Qty: 90 TABLET | Refills: 0 | Status: SHIPPED | OUTPATIENT
Start: 2023-01-06 | End: 2023-01-09 | Stop reason: SDUPTHER

## 2023-01-06 NOTE — TELEPHONE ENCOUNTER
Called patient today to discuss his critical lab results. I was unable to reach patient so I dropped a voice message. I was also able to reach his wife, asked her to convey message to the patient to give the office a call back. Wife confirms understanding.

## 2023-01-09 DIAGNOSIS — E11.65 UNCONTROLLED TYPE 2 DIABETES MELLITUS WITH HYPERGLYCEMIA (HCC): ICD-10-CM

## 2023-01-09 DIAGNOSIS — E78.5 HYPERLIPIDEMIA, UNSPECIFIED HYPERLIPIDEMIA TYPE: ICD-10-CM

## 2023-01-09 NOTE — TELEPHONE ENCOUNTER
Patient's medication was sent to wrong pharmacy. Patient states he did not  the medication because the [facility] does not accept his insurance.      Patient's pharmacy has been updated

## 2023-01-11 RX ORDER — EZETIMIBE 10 MG/1
10 TABLET ORAL DAILY
Qty: 90 TABLET | Refills: 0 | Status: SHIPPED | OUTPATIENT
Start: 2023-01-11 | End: 2023-04-11

## 2023-01-11 RX ORDER — METFORMIN HYDROCHLORIDE 1000 MG/1
TABLET ORAL
Qty: 180 TABLET | Refills: 0 | Status: SHIPPED | OUTPATIENT
Start: 2023-01-11

## 2023-01-11 NOTE — TELEPHONE ENCOUNTER
Per verbal approval by Dr. Igor Walter, refills for metformin 1000 mg twice daily and ezetimibe 10 mg daily has been signed and sent to patients pharmacy of choice.

## 2023-04-04 SDOH — ECONOMIC STABILITY: TRANSPORTATION INSECURITY
IN THE PAST 12 MONTHS, HAS LACK OF TRANSPORTATION KEPT YOU FROM MEETINGS, WORK, OR FROM GETTING THINGS NEEDED FOR DAILY LIVING?: PATIENT DECLINED

## 2023-04-04 SDOH — ECONOMIC STABILITY: INCOME INSECURITY: HOW HARD IS IT FOR YOU TO PAY FOR THE VERY BASICS LIKE FOOD, HOUSING, MEDICAL CARE, AND HEATING?: PATIENT DECLINED

## 2023-04-04 SDOH — ECONOMIC STABILITY: HOUSING INSECURITY
IN THE LAST 12 MONTHS, WAS THERE A TIME WHEN YOU DID NOT HAVE A STEADY PLACE TO SLEEP OR SLEPT IN A SHELTER (INCLUDING NOW)?: PATIENT REFUSED

## 2023-04-04 SDOH — ECONOMIC STABILITY: FOOD INSECURITY: WITHIN THE PAST 12 MONTHS, YOU WORRIED THAT YOUR FOOD WOULD RUN OUT BEFORE YOU GOT MONEY TO BUY MORE.: PATIENT DECLINED

## 2023-04-04 SDOH — ECONOMIC STABILITY: FOOD INSECURITY: WITHIN THE PAST 12 MONTHS, THE FOOD YOU BOUGHT JUST DIDN'T LAST AND YOU DIDN'T HAVE MONEY TO GET MORE.: PATIENT DECLINED

## 2023-04-06 ENCOUNTER — OFFICE VISIT (OUTPATIENT)
Age: 43
End: 2023-04-06
Payer: MEDICAID

## 2023-04-06 VITALS
TEMPERATURE: 97.7 F | DIASTOLIC BLOOD PRESSURE: 78 MMHG | RESPIRATION RATE: 16 BRPM | SYSTOLIC BLOOD PRESSURE: 118 MMHG | HEART RATE: 92 BPM | OXYGEN SATURATION: 97 % | BODY MASS INDEX: 36.9 KG/M2 | WEIGHT: 303 LBS | HEIGHT: 76 IN

## 2023-04-06 DIAGNOSIS — E78.2 MIXED HYPERLIPIDEMIA: ICD-10-CM

## 2023-04-06 DIAGNOSIS — E11.65 UNCONTROLLED TYPE 2 DIABETES MELLITUS WITH HYPERGLYCEMIA (HCC): Primary | ICD-10-CM

## 2023-04-06 DIAGNOSIS — E66.01 CLASS 2 SEVERE OBESITY DUE TO EXCESS CALORIES WITH SERIOUS COMORBIDITY AND BODY MASS INDEX (BMI) OF 36.0 TO 36.9 IN ADULT (HCC): ICD-10-CM

## 2023-04-06 DIAGNOSIS — Z11.4 ENCOUNTER FOR SCREENING FOR HIV: ICD-10-CM

## 2023-04-06 DIAGNOSIS — I10 ESSENTIAL (PRIMARY) HYPERTENSION: ICD-10-CM

## 2023-04-06 PROCEDURE — 3046F HEMOGLOBIN A1C LEVEL >9.0%: CPT | Performed by: STUDENT IN AN ORGANIZED HEALTH CARE EDUCATION/TRAINING PROGRAM

## 2023-04-06 PROCEDURE — 99214 OFFICE O/P EST MOD 30 MIN: CPT | Performed by: STUDENT IN AN ORGANIZED HEALTH CARE EDUCATION/TRAINING PROGRAM

## 2023-04-06 PROCEDURE — 3078F DIAST BP <80 MM HG: CPT | Performed by: STUDENT IN AN ORGANIZED HEALTH CARE EDUCATION/TRAINING PROGRAM

## 2023-04-06 PROCEDURE — 3074F SYST BP LT 130 MM HG: CPT | Performed by: STUDENT IN AN ORGANIZED HEALTH CARE EDUCATION/TRAINING PROGRAM

## 2023-04-06 RX ORDER — ATORVASTATIN CALCIUM 40 MG/1
40 TABLET, FILM COATED ORAL DAILY
COMMUNITY
Start: 2023-03-20

## 2023-04-06 RX ORDER — AMLODIPINE BESYLATE 10 MG/1
10 TABLET ORAL DAILY
COMMUNITY
Start: 2021-11-04

## 2023-04-06 RX ORDER — LISINOPRIL 10 MG/1
10 TABLET ORAL DAILY
COMMUNITY
Start: 2023-03-18

## 2023-04-06 NOTE — PROGRESS NOTES
1. \"Have you been to the ER, urgent care clinic since your last visit? Hospitalized since your last visit? \" No    2. \"Have you seen or consulted any other health care providers outside of the [address] [place] [address] since your last visit? \" No     3. For patients aged 39-70: Has the patient had a colonoscopy / FIT/ Cologuard?  NA - based on age
vomiting or weakness. The symptoms are aggravated by eating. Treatments tried: Lifestyle modification. The treatment provided mild relief. Review of Systems   Constitutional:  Negative for activity change, appetite change, fatigue and fever. HENT:  Negative for congestion, hearing loss, postnasal drip, rhinorrhea, sore throat, trouble swallowing and voice change. Eyes:  Negative for photophobia, discharge, itching and visual disturbance. Respiratory:  Negative for cough, shortness of breath and wheezing. Cardiovascular:  Negative for chest pain, palpitations and leg swelling. Gastrointestinal:  Negative for abdominal pain, constipation, diarrhea, nausea and vomiting. Genitourinary:  Negative for difficulty urinating and dysuria. Musculoskeletal:  Negative for arthralgias and back pain. Skin:  Negative for rash. Neurological:  Negative for dizziness, weakness, light-headedness and headaches. Psychiatric/Behavioral:  Negative for sleep disturbance. The patient is not nervous/anxious. Objective: /78 (Site: Left Upper Arm, Position: Sitting, Cuff Size: Large Adult)   Pulse 92   Temp 97.7 °F (36.5 °C) (Temporal)   Resp 16   Ht 6' 4\" (1.93 m)   Wt (!) 303 lb (137.4 kg)   SpO2 97%   BMI 36.88 kg/m²     Physical Exam  Constitutional:       General: He is not in acute distress. Appearance: Normal appearance. He is not ill-appearing, toxic-appearing or diaphoretic. HENT:      Head: Normocephalic and atraumatic. Right Ear: Tympanic membrane, ear canal and external ear normal. There is no impacted cerumen. Left Ear: Tympanic membrane, ear canal and external ear normal. There is no impacted cerumen. Nose: Nose normal. No congestion or rhinorrhea. Mouth/Throat:      Mouth: Mucous membranes are moist.      Pharynx: Oropharynx is clear. No oropharyngeal exudate or posterior oropharyngeal erythema. Eyes:      General: No scleral icterus.         Right eye: No

## 2023-04-07 ASSESSMENT — ENCOUNTER SYMPTOMS
WHEEZING: 0
DIARRHEA: 0
BACK PAIN: 0
EYE ITCHING: 0
COUGH: 0
NAUSEA: 0
TROUBLE SWALLOWING: 0
VOICE CHANGE: 0
CONSTIPATION: 0
VOMITING: 0
SORE THROAT: 0
PHOTOPHOBIA: 0
RHINORRHEA: 0
ABDOMINAL PAIN: 0
SHORTNESS OF BREATH: 0
EYE DISCHARGE: 0

## 2023-07-10 ENCOUNTER — OFFICE VISIT (OUTPATIENT)
Age: 43
End: 2023-07-10
Payer: MEDICAID

## 2023-07-10 VITALS
TEMPERATURE: 97.3 F | HEIGHT: 76 IN | HEART RATE: 71 BPM | OXYGEN SATURATION: 98 % | WEIGHT: 302.2 LBS | RESPIRATION RATE: 14 BRPM | BODY MASS INDEX: 36.8 KG/M2 | DIASTOLIC BLOOD PRESSURE: 75 MMHG | SYSTOLIC BLOOD PRESSURE: 118 MMHG

## 2023-07-10 DIAGNOSIS — R19.00 ABDOMINAL WALL BULGE: ICD-10-CM

## 2023-07-10 DIAGNOSIS — E11.65 TYPE 2 DIABETES MELLITUS WITH HYPERGLYCEMIA, WITHOUT LONG-TERM CURRENT USE OF INSULIN (HCC): Primary | ICD-10-CM

## 2023-07-10 DIAGNOSIS — E66.01 CLASS 2 SEVERE OBESITY DUE TO EXCESS CALORIES WITH SERIOUS COMORBIDITY AND BODY MASS INDEX (BMI) OF 36.0 TO 36.9 IN ADULT (HCC): ICD-10-CM

## 2023-07-10 DIAGNOSIS — E78.5 HYPERLIPIDEMIA, UNSPECIFIED HYPERLIPIDEMIA TYPE: ICD-10-CM

## 2023-07-10 DIAGNOSIS — I10 ESSENTIAL (PRIMARY) HYPERTENSION: ICD-10-CM

## 2023-07-10 DIAGNOSIS — I83.91 VARICOSE VEINS OF RIGHT LOWER EXTREMITY, UNSPECIFIED WHETHER COMPLICATED: ICD-10-CM

## 2023-07-10 PROCEDURE — 99215 OFFICE O/P EST HI 40 MIN: CPT | Performed by: STUDENT IN AN ORGANIZED HEALTH CARE EDUCATION/TRAINING PROGRAM

## 2023-07-10 PROCEDURE — 3074F SYST BP LT 130 MM HG: CPT | Performed by: STUDENT IN AN ORGANIZED HEALTH CARE EDUCATION/TRAINING PROGRAM

## 2023-07-10 PROCEDURE — 99211 OFF/OP EST MAY X REQ PHY/QHP: CPT

## 2023-07-10 PROCEDURE — 3078F DIAST BP <80 MM HG: CPT | Performed by: STUDENT IN AN ORGANIZED HEALTH CARE EDUCATION/TRAINING PROGRAM

## 2023-07-10 PROCEDURE — 3052F HG A1C>EQUAL 8.0%<EQUAL 9.0%: CPT | Performed by: STUDENT IN AN ORGANIZED HEALTH CARE EDUCATION/TRAINING PROGRAM

## 2023-07-10 ASSESSMENT — ENCOUNTER SYMPTOMS
WHEEZING: 0
COLOR CHANGE: 1
SHORTNESS OF BREATH: 0
RHINORRHEA: 0
VOICE CHANGE: 0
BACK PAIN: 0
PHOTOPHOBIA: 0
TROUBLE SWALLOWING: 0
COUGH: 0
ABDOMINAL PAIN: 0
CONSTIPATION: 0
VOMITING: 0
DIARRHEA: 0
EYE DISCHARGE: 0
NAUSEA: 0
SORE THROAT: 0
EYE ITCHING: 0

## 2023-07-10 NOTE — PROGRESS NOTES
1. \"Have you been to the ER, urgent care clinic since your last visit? Hospitalized since your last visit? \" No    2. \"Have you seen or consulted any other health care providers outside of the [address] [place] [address] since your last visit? \" No     3. For patients aged 43-73: Has the patient had a colonoscopy / FIT/ Cologuard?  NA - based on age

## 2023-07-11 ENCOUNTER — TELEPHONE (OUTPATIENT)
Age: 43
End: 2023-07-11

## 2023-07-11 NOTE — TELEPHONE ENCOUNTER
----- Message from Edgardo Concepcion sent at 7/11/2023  1:39 PM EDT -----  Subject: Message to Provider    QUESTIONS  Information for Provider? needs to schedule labs asap  ---------------------------------------------------------------------------  --------------  [address]  [phone]; OK to leave message on voicemail  ---------------------------------------------------------------------------  --------------  SCRIPT ANSWERS  Relationship to Patient? Spouse/Partner  Representative Name? Yesika  Is the representative on the Communication Release of Information (VIRIDIANA)   form in Epic?  Yes Closure 2 Information: This tab is for additional flaps and grafts, including complex repair and grafts and complex repair and flaps. You can also specify a different location for the additional defect, if the location is the same you do not need to select a new one. We will insert the automated text for the repair you select below just as we do for solitary flaps and grafts. Please note that at this time if you select a location with a different insurance zone you will need to override the ICD10 and CPT if appropriate.

## 2023-07-11 NOTE — TELEPHONE ENCOUNTER
Called patient to schedule a lab appointment. Patient didn't answer when I called. Left a voicemail for patient to call back via patient's voicemail.

## 2023-07-16 DIAGNOSIS — K43.9 VENTRAL HERNIA WITHOUT OBSTRUCTION OR GANGRENE: Primary | ICD-10-CM

## 2023-07-26 DIAGNOSIS — E11.65 TYPE 2 DIABETES MELLITUS WITH HYPERGLYCEMIA (HCC): ICD-10-CM

## 2023-07-26 DIAGNOSIS — I10 ESSENTIAL (PRIMARY) HYPERTENSION: ICD-10-CM

## 2023-07-26 DIAGNOSIS — E78.5 HYPERLIPIDEMIA, UNSPECIFIED: ICD-10-CM

## 2023-07-26 NOTE — TELEPHONE ENCOUNTER
Last Visit: 07- OV   Next Appointment: 10-  Previous Refill Encounter: amlodipine on 04- #30 tabs with 2 refills  Lisinopril on 04- # 30 tabs with 2 refills  Metformin on 04- # 60 tabs with 2 refills  Zetia on 04- # 30 tabs with 2 refills  Lipitor on 04- # 30 tabs with 2 refills      Requested Prescriptions     Pending Prescriptions Disp Refills    atorvastatin (LIPITOR) 40 MG tablet 30 tablet 2     Sig: Take 1 tablet by mouth daily    ezetimibe (ZETIA) 10 MG tablet 30 tablet 2     Sig: Take 1 tablet by mouth daily    metFORMIN (GLUCOPHAGE) 1000 MG tablet 60 tablet 2    lisinopril (PRINIVIL;ZESTRIL) 10 MG tablet 30 tablet 2     Sig: Take 1 tablet by mouth daily    amLODIPine (NORVASC) 10 MG tablet 30 tablet 2     Sig: Take 1 tablet by mouth daily

## 2023-07-27 DIAGNOSIS — E78.5 HYPERLIPIDEMIA, UNSPECIFIED: ICD-10-CM

## 2023-07-27 DIAGNOSIS — E11.65 TYPE 2 DIABETES MELLITUS WITH HYPERGLYCEMIA (HCC): ICD-10-CM

## 2023-07-27 DIAGNOSIS — I10 ESSENTIAL (PRIMARY) HYPERTENSION: ICD-10-CM

## 2023-07-28 DIAGNOSIS — E11.65 UNCONTROLLED TYPE 2 DIABETES MELLITUS WITH HYPERGLYCEMIA (HCC): ICD-10-CM

## 2023-07-28 DIAGNOSIS — E66.01 CLASS 2 SEVERE OBESITY DUE TO EXCESS CALORIES WITH SERIOUS COMORBIDITY AND BODY MASS INDEX (BMI) OF 36.0 TO 36.9 IN ADULT (HCC): ICD-10-CM

## 2023-07-28 RX ORDER — AMLODIPINE BESYLATE 10 MG/1
10 TABLET ORAL DAILY
Qty: 30 TABLET | Refills: 2 | Status: SHIPPED | OUTPATIENT
Start: 2023-07-28

## 2023-07-28 RX ORDER — ATORVASTATIN CALCIUM 40 MG/1
40 TABLET, FILM COATED ORAL DAILY
Qty: 30 TABLET | Refills: 2 | Status: SHIPPED | OUTPATIENT
Start: 2023-07-28

## 2023-07-28 RX ORDER — EZETIMIBE 10 MG/1
10 TABLET ORAL DAILY
Qty: 30 TABLET | Refills: 2 | Status: SHIPPED | OUTPATIENT
Start: 2023-07-28

## 2023-07-28 RX ORDER — DULAGLUTIDE 0.75 MG/.5ML
INJECTION, SOLUTION SUBCUTANEOUS
Qty: 2 ADJUSTABLE DOSE PRE-FILLED PEN SYRINGE | Refills: 3 | OUTPATIENT
Start: 2023-07-28

## 2023-07-28 RX ORDER — LISINOPRIL 10 MG/1
10 TABLET ORAL DAILY
Qty: 30 TABLET | Refills: 2 | Status: SHIPPED | OUTPATIENT
Start: 2023-07-28

## 2023-07-28 NOTE — TELEPHONE ENCOUNTER
Requested Prescriptions     Pending Prescriptions Disp Refills    atorvastatin (LIPITOR) 40 MG tablet 30 tablet 2     Sig: Take 1 tablet by mouth daily    ezetimibe (ZETIA) 10 MG tablet 30 tablet 2     Sig: Take 1 tablet by mouth daily    metFORMIN (GLUCOPHAGE) 1000 MG tablet 60 tablet 2    lisinopril (PRINIVIL;ZESTRIL) 10 MG tablet 30 tablet 2     Sig: Take 1 tablet by mouth daily    amLODIPine (NORVASC) 10 MG tablet 30 tablet 2     Sig: Take 1 tablet by mouth daily

## 2023-07-29 RX ORDER — LISINOPRIL 10 MG/1
10 TABLET ORAL DAILY
Qty: 30 TABLET | Refills: 2 | OUTPATIENT
Start: 2023-07-29

## 2023-07-29 RX ORDER — AMLODIPINE BESYLATE 10 MG/1
10 TABLET ORAL DAILY
Qty: 30 TABLET | Refills: 2 | OUTPATIENT
Start: 2023-07-29

## 2023-07-29 RX ORDER — EZETIMIBE 10 MG/1
10 TABLET ORAL DAILY
Qty: 30 TABLET | Refills: 2 | OUTPATIENT
Start: 2023-07-29

## 2023-07-29 RX ORDER — ATORVASTATIN CALCIUM 40 MG/1
40 TABLET, FILM COATED ORAL DAILY
Qty: 30 TABLET | Refills: 2 | OUTPATIENT
Start: 2023-07-29

## 2023-09-28 ENCOUNTER — OFFICE VISIT (OUTPATIENT)
Age: 43
End: 2023-09-28
Payer: COMMERCIAL

## 2023-09-28 VITALS
HEART RATE: 69 BPM | DIASTOLIC BLOOD PRESSURE: 80 MMHG | BODY MASS INDEX: 36.81 KG/M2 | OXYGEN SATURATION: 98 % | WEIGHT: 302.25 LBS | SYSTOLIC BLOOD PRESSURE: 128 MMHG | HEIGHT: 76 IN

## 2023-09-28 DIAGNOSIS — I83.811 VARICOSE VEINS OF RIGHT LOWER EXTREMITY WITH PAIN: Primary | ICD-10-CM

## 2023-09-28 PROCEDURE — 3079F DIAST BP 80-89 MM HG: CPT | Performed by: SURGERY

## 2023-09-28 PROCEDURE — 99203 OFFICE O/P NEW LOW 30 MIN: CPT | Performed by: SURGERY

## 2023-09-28 PROCEDURE — 3074F SYST BP LT 130 MM HG: CPT | Performed by: SURGERY

## 2023-09-28 ASSESSMENT — PATIENT HEALTH QUESTIONNAIRE - PHQ9
SUM OF ALL RESPONSES TO PHQ QUESTIONS 1-9: 0
2. FEELING DOWN, DEPRESSED OR HOPELESS: 0
SUM OF ALL RESPONSES TO PHQ QUESTIONS 1-9: 0
SUM OF ALL RESPONSES TO PHQ9 QUESTIONS 1 & 2: 0
1. LITTLE INTEREST OR PLEASURE IN DOING THINGS: 0

## 2023-09-28 NOTE — PROGRESS NOTES
1. Have you been to the ER, urgent care clinic since your last visit? No     Hospitalized since your last visit? No     2. Have you seen or consulted any other health care providers outside of the [address] [place] [address] since your last visit? Include any pap smears or colon screening.   No

## 2023-09-28 NOTE — PROGRESS NOTES
Cuco Soto    Chief Complaint   Patient presents with    New Patient    Varicose Veins       History and Physical    Ossie Lefort is a 37 y.o. male with PMH significant for HTN on amlodipine, HLD, NIDDM (A1c 8.1), morbid obesity with BMI > 36.     he presents today for varicose veins. he describes RLE varicose veins and edema  - edema worse with standing and walking    H/o trauma 20+ years ago, involving anterior leg. Has experienced worsening varicose veins since then. 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 Associated symptoms:   [x] edema  [x] varicose veins  [] heaviness/aching  [] fatigue  [] Pain  [] H/o or current ulcer(s)    Aggravating factors include standing, walking for a long period of time. Relieving factors include elevation. Patient   [x] has   [] has not   been wearing compression stockings. These are knee high and they have helped with the symptoms (decreased edema and less itching). He has a hard time with wearing them because they get so hot.    Has been wearing them for > 3 months    Relevant history:   [] female gender  [] Family history of venous disease: not sure  [] history of pregnancy: n/a  [] history of DVT/PE  [] history of vein procedure                Past Medical History:   Diagnosis Date    Elevated triglycerides with high cholesterol 3907-7522    500-600    H/O fracture of foot     right 4th5th metatarsal    Insomnia     years, trouble falling asleep, related to ruminations, trouble staying asleep    Migraines     childhood, on average every 2 weeks    Obesity (BMI 35.0-39.9 without comorbidity)     Right foot pain     15 years ago stepped awkward heard a pop     Past Surgical History:   Procedure Laterality Date    APPENDECTOMY       Patient

## 2023-10-05 ENCOUNTER — HOSPITAL ENCOUNTER (OUTPATIENT)
Facility: HOSPITAL | Age: 43
Discharge: HOME | End: 2023-10-08

## 2023-10-05 LAB — LABCORP SPECIMEN COLLECTION: NORMAL

## 2023-10-06 LAB
ALBUMIN/CREAT UR: 5 MG/G CREAT (ref 0–29)
CREAT UR-MCNC: 288.4 MG/DL
HBA1C MFR BLD: 8.6 % (ref 4.8–5.6)
MICROALBUMIN UR-MCNC: 13.3 UG/ML

## 2023-10-19 ENCOUNTER — CLINICAL DOCUMENTATION (OUTPATIENT)
Age: 43
End: 2023-10-19

## 2023-10-19 NOTE — PROGRESS NOTES
Prior Authorization for Trulicity 0.6RH/3.6AA completed and approved by patient's insurance from 09- through 18-. Additional Information Required  This request has been approved using information available on the patient's profile. OCZMPN:[id number];ILALVH:[id number]; Review Type:Prior Auth; Coverage Start Date:09/19/2023; Coverage End Date:10/18/2024;

## 2023-10-26 ENCOUNTER — OFFICE VISIT (OUTPATIENT)
Age: 43
End: 2023-10-26
Payer: COMMERCIAL

## 2023-10-26 VITALS
HEART RATE: 76 BPM | SYSTOLIC BLOOD PRESSURE: 139 MMHG | TEMPERATURE: 97.5 F | BODY MASS INDEX: 37.63 KG/M2 | WEIGHT: 309 LBS | DIASTOLIC BLOOD PRESSURE: 80 MMHG | OXYGEN SATURATION: 98 % | HEIGHT: 76 IN | RESPIRATION RATE: 20 BRPM

## 2023-10-26 DIAGNOSIS — E66.01 CLASS 2 SEVERE OBESITY DUE TO EXCESS CALORIES WITH SERIOUS COMORBIDITY AND BODY MASS INDEX (BMI) OF 37.0 TO 37.9 IN ADULT (HCC): ICD-10-CM

## 2023-10-26 DIAGNOSIS — Z00.00 WELL ADULT EXAM: ICD-10-CM

## 2023-10-26 DIAGNOSIS — E11.65 UNCONTROLLED TYPE 2 DIABETES MELLITUS WITH HYPERGLYCEMIA (HCC): ICD-10-CM

## 2023-10-26 DIAGNOSIS — E78.5 HYPERLIPIDEMIA, UNSPECIFIED HYPERLIPIDEMIA TYPE: ICD-10-CM

## 2023-10-26 DIAGNOSIS — I10 PRIMARY HYPERTENSION: Primary | ICD-10-CM

## 2023-10-26 PROCEDURE — 99214 OFFICE O/P EST MOD 30 MIN: CPT | Performed by: STUDENT IN AN ORGANIZED HEALTH CARE EDUCATION/TRAINING PROGRAM

## 2023-10-26 PROCEDURE — 3052F HG A1C>EQUAL 8.0%<EQUAL 9.0%: CPT | Performed by: STUDENT IN AN ORGANIZED HEALTH CARE EDUCATION/TRAINING PROGRAM

## 2023-10-26 PROCEDURE — 3075F SYST BP GE 130 - 139MM HG: CPT | Performed by: STUDENT IN AN ORGANIZED HEALTH CARE EDUCATION/TRAINING PROGRAM

## 2023-10-26 PROCEDURE — 3079F DIAST BP 80-89 MM HG: CPT | Performed by: STUDENT IN AN ORGANIZED HEALTH CARE EDUCATION/TRAINING PROGRAM

## 2023-10-26 ASSESSMENT — ENCOUNTER SYMPTOMS
WHEEZING: 0
CONSTIPATION: 0
RHINORRHEA: 0
SORE THROAT: 0
NAUSEA: 0
ABDOMINAL PAIN: 0
BACK PAIN: 0
COLOR CHANGE: 1
COUGH: 0
EYE DISCHARGE: 0
DIARRHEA: 0
EYE ITCHING: 0
PHOTOPHOBIA: 0
TROUBLE SWALLOWING: 0
VOICE CHANGE: 0
VOMITING: 0

## 2023-11-09 ENCOUNTER — OFFICE VISIT (OUTPATIENT)
Age: 43
End: 2023-11-09
Payer: COMMERCIAL

## 2023-11-09 VITALS
HEART RATE: 75 BPM | TEMPERATURE: 98.7 F | DIASTOLIC BLOOD PRESSURE: 81 MMHG | BODY MASS INDEX: 36.9 KG/M2 | SYSTOLIC BLOOD PRESSURE: 149 MMHG | WEIGHT: 303 LBS | HEIGHT: 76 IN | OXYGEN SATURATION: 94 %

## 2023-11-09 DIAGNOSIS — K43.2 INCISIONAL HERNIA, WITHOUT OBSTRUCTION OR GANGRENE: Primary | ICD-10-CM

## 2023-11-09 DIAGNOSIS — K42.9 UMBILICAL HERNIA WITHOUT OBSTRUCTION AND WITHOUT GANGRENE: ICD-10-CM

## 2023-11-09 PROCEDURE — 3079F DIAST BP 80-89 MM HG: CPT | Performed by: SURGERY

## 2023-11-09 PROCEDURE — 3077F SYST BP >= 140 MM HG: CPT | Performed by: SURGERY

## 2023-11-09 PROCEDURE — 99204 OFFICE O/P NEW MOD 45 MIN: CPT | Performed by: SURGERY

## 2023-11-10 ENCOUNTER — TELEPHONE (OUTPATIENT)
Age: 43
End: 2023-11-10

## 2023-11-13 ENCOUNTER — OFFICE VISIT (OUTPATIENT)
Age: 43
End: 2023-11-13
Payer: COMMERCIAL

## 2023-11-13 VITALS
SYSTOLIC BLOOD PRESSURE: 130 MMHG | DIASTOLIC BLOOD PRESSURE: 80 MMHG | BODY MASS INDEX: 37.14 KG/M2 | OXYGEN SATURATION: 98 % | HEIGHT: 76 IN | WEIGHT: 305 LBS | HEART RATE: 72 BPM

## 2023-11-13 DIAGNOSIS — I83.811 VARICOSE VEINS OF RIGHT LOWER EXTREMITY WITH PAIN: Primary | ICD-10-CM

## 2023-11-13 PROCEDURE — 3079F DIAST BP 80-89 MM HG: CPT | Performed by: SURGERY

## 2023-11-13 PROCEDURE — 99214 OFFICE O/P EST MOD 30 MIN: CPT | Performed by: SURGERY

## 2023-11-13 PROCEDURE — 3075F SYST BP GE 130 - 139MM HG: CPT | Performed by: SURGERY

## 2023-11-13 NOTE — PROGRESS NOTES
[facility]    Chief Complaint   Patient presents with    Varicose Veins     Follow up with studies        History and Physical    Nicolasa Party is a 37 y.o. male with PMH significant for HTN on amlodipine, HLD, NIDDM (A1c 8.1), morbid obesity with BMI > 36.     he returns today for varicose veins. Since his last visit there have been no changes in his symptoms. The most recent PVL was reviewed and discussed with the patient. This shows clinically significant venous insufficiency in the right GSV And associated varicose veins        Previously obtained venous history:   he describes RLE varicose veins and edema  - edema worse with standing and walking    H/o trauma 20+ years ago, involving anterior leg. Has experienced worsening varicose veins since then. 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 Associated symptoms:   [x] edema  [x] varicose veins  [] heaviness/aching  [] fatigue  [] Pain  [] H/o or current ulcer(s)    Aggravating factors include standing, walking for a long period of time. Relieving factors include elevation. Patient   [x] has   [] has not   been wearing compression stockings. These are knee high and they have helped with the symptoms (decreased edema and less itching). He has a hard time with wearing them because they get so hot.    Has been wearing them for > 3 months    Relevant history:   [] female gender  [] Family history of venous disease: not sure  [] history of pregnancy: n/a  [] history of DVT/PE  [] history of vein procedure                Past Medical History:   Diagnosis Date    Elevated triglycerides with high cholesterol [id number]    500-600    H/O fracture of foot     right 4th5th metatarsal    Insomnia     years, trouble falling asleep, related

## 2023-11-21 ENCOUNTER — TELEPHONE (OUTPATIENT)
Age: 43
End: 2023-11-21

## 2023-11-21 NOTE — TELEPHONE ENCOUNTER
Called and left message at 232pm to discuss when he wants to scheduled his ablation surgery with Dr. Maximino Thomson. Waiting for patient to call back.

## 2023-11-28 ENCOUNTER — TELEPHONE (OUTPATIENT)
Age: 43
End: 2023-11-28

## 2023-11-28 NOTE — TELEPHONE ENCOUNTER
Called and left message at 415pm to schedule patient for right lower extremity great saphenous vein radiofrequency ablation and stab phlebectomy with Dr. Vanessa Ro. Waiting for patient to call back.

## 2023-12-26 ENCOUNTER — TELEPHONE (OUTPATIENT)
Age: 43
End: 2023-12-26

## 2023-12-26 NOTE — TELEPHONE ENCOUNTER
Called patient at 10:01am on 12/26/2023 and left message. Need to schedule patient for Right lower extremity great saphenous vein radiofrequency ablation with stab phlebectomy with Dr. Abdiel Pierre. Waiting for patient to call back.

## 2024-01-04 ENCOUNTER — TELEPHONE (OUTPATIENT)
Age: 44
End: 2024-01-04

## 2024-01-04 NOTE — TELEPHONE ENCOUNTER
Called patient at 1020am on 01/04/2024 and left message. Called to schedule patient for right lower extremity GSV RF with stab phlebectomy procedure with Dr. Freitas. Waiting for patient to call back. Sent patient a [employer] message to call the office.

## 2024-01-18 ENCOUNTER — HOSPITAL ENCOUNTER (OUTPATIENT)
Facility: HOSPITAL | Age: 44
Setting detail: SPECIMEN
Discharge: HOME | End: 2024-01-18
Payer: COMMERCIAL

## 2024-01-18 ENCOUNTER — APPOINTMENT (OUTPATIENT)
Age: 44
End: 2024-01-18
Payer: COMMERCIAL

## 2024-01-18 DIAGNOSIS — I10 PRIMARY HYPERTENSION: ICD-10-CM

## 2024-01-18 DIAGNOSIS — E11.65 UNCONTROLLED TYPE 2 DIABETES MELLITUS WITH HYPERGLYCEMIA (HCC): ICD-10-CM

## 2024-01-18 DIAGNOSIS — Z00.00 WELL ADULT EXAM: ICD-10-CM

## 2024-01-18 DIAGNOSIS — E78.5 HYPERLIPIDEMIA, UNSPECIFIED HYPERLIPIDEMIA TYPE: ICD-10-CM

## 2024-01-18 LAB
ALBUMIN SERPL-MCNC: 3.6 G/DL (ref 3.4–5)
ALBUMIN/GLOB SERPL: 1.3 (ref 0.8–1.7)
ALP SERPL-CCNC: 68 U/L (ref 45–117)
ALT SERPL-CCNC: 44 U/L (ref 16–61)
ANION GAP SERPL CALC-SCNC: 3 MMOL/L (ref 3–18)
AST SERPL-CCNC: 20 U/L (ref 10–38)
BILIRUB SERPL-MCNC: 0.5 MG/DL (ref 0.2–1)
BUN SERPL-MCNC: 13 MG/DL (ref 7–18)
BUN/CREAT SERPL: 15 (ref 12–20)
CALCIUM SERPL-MCNC: 9.1 MG/DL (ref 8.5–10.1)
CHLORIDE SERPL-SCNC: 108 MMOL/L (ref 100–111)
CHOLEST SERPL-MCNC: 121 MG/DL
CO2 SERPL-SCNC: 29 MMOL/L (ref 21–32)
CREAT SERPL-MCNC: 0.86 MG/DL (ref 0.6–1.3)
ERYTHROCYTE [DISTWIDTH] IN BLOOD BY AUTOMATED COUNT: 12.4 % (ref 11.6–14.5)
EST. AVERAGE GLUCOSE BLD GHB EST-MCNC: 180 MG/DL
GLOBULIN SER CALC-MCNC: 2.8 G/DL (ref 2–4)
GLUCOSE SERPL-MCNC: 150 MG/DL (ref 74–99)
HBA1C MFR BLD: 7.9 % (ref 4.2–5.6)
HCT VFR BLD AUTO: 46 % (ref 36–48)
HDLC SERPL-MCNC: 36 MG/DL (ref 40–60)
HDLC SERPL: 3.4 (ref 0–5)
HGB BLD-MCNC: 15.4 G/DL (ref 13–16)
LDLC SERPL CALC-MCNC: 56.4 MG/DL (ref 0–100)
LIPID PANEL: ABNORMAL
MCH RBC QN AUTO: 28.9 PG (ref 24–34)
MCHC RBC AUTO-ENTMCNC: 33.5 G/DL (ref 31–37)
MCV RBC AUTO: 86.5 FL (ref 78–100)
NRBC # BLD: 0 K/UL (ref 0–0.01)
NRBC BLD-RTO: 0 PER 100 WBC
PLATELET # BLD AUTO: 226 K/UL (ref 135–420)
PMV BLD AUTO: 10.3 FL (ref 9.2–11.8)
POTASSIUM SERPL-SCNC: 4.3 MMOL/L (ref 3.5–5.5)
PROT SERPL-MCNC: 6.4 G/DL (ref 6.4–8.2)
RBC # BLD AUTO: 5.32 M/UL (ref 4.35–5.65)
SODIUM SERPL-SCNC: 140 MMOL/L (ref 136–145)
TRIGL SERPL-MCNC: 143 MG/DL
VLDLC SERPL CALC-MCNC: 28.6 MG/DL
WBC # BLD AUTO: 7.2 K/UL (ref 4.6–13.2)

## 2024-01-18 PROCEDURE — 36415 COLL VENOUS BLD VENIPUNCTURE: CPT

## 2024-01-18 PROCEDURE — 83036 HEMOGLOBIN GLYCOSYLATED A1C: CPT

## 2024-01-18 PROCEDURE — 80061 LIPID PANEL: CPT

## 2024-01-18 PROCEDURE — 80053 COMPREHEN METABOLIC PANEL: CPT

## 2024-01-18 PROCEDURE — 85027 COMPLETE CBC AUTOMATED: CPT

## 2024-01-24 NOTE — PROGRESS NOTES
1. \"Have you been to the ER, urgent care clinic since your last visit?  Hospitalized since your last visit?\" No    2. \"Have you seen or consulted any other health care providers outside of the [facility] since your last visit?\" No     3. For patients aged 45-75: Has the patient had a colonoscopy / FIT/ Cologuard? NA - based on age

## 2024-01-25 ENCOUNTER — OFFICE VISIT (OUTPATIENT)
Age: 44
End: 2024-01-25

## 2024-01-25 VITALS
HEART RATE: 93 BPM | HEIGHT: 76 IN | WEIGHT: 304 LBS | SYSTOLIC BLOOD PRESSURE: 120 MMHG | RESPIRATION RATE: 16 BRPM | DIASTOLIC BLOOD PRESSURE: 81 MMHG | TEMPERATURE: 97.7 F | BODY MASS INDEX: 37.02 KG/M2 | OXYGEN SATURATION: 97 %

## 2024-01-25 DIAGNOSIS — N52.9 ERECTILE DYSFUNCTION, UNSPECIFIED ERECTILE DYSFUNCTION TYPE: ICD-10-CM

## 2024-01-25 DIAGNOSIS — Z00.00 ENCOUNTER FOR WELL ADULT EXAM WITHOUT ABNORMAL FINDINGS: Primary | ICD-10-CM

## 2024-01-25 DIAGNOSIS — E11.65 UNCONTROLLED TYPE 2 DIABETES MELLITUS WITH HYPERGLYCEMIA (HCC): ICD-10-CM

## 2024-01-25 DIAGNOSIS — E66.01 CLASS 2 SEVERE OBESITY DUE TO EXCESS CALORIES WITH SERIOUS COMORBIDITY AND BODY MASS INDEX (BMI) OF 37.0 TO 37.9 IN ADULT (HCC): ICD-10-CM

## 2024-01-25 DIAGNOSIS — I10 PRIMARY HYPERTENSION: ICD-10-CM

## 2024-01-25 RX ORDER — SILDENAFIL 50 MG/1
50 TABLET, FILM COATED ORAL DAILY PRN
Qty: 10 TABLET | Refills: 5 | Status: SHIPPED | OUTPATIENT
Start: 2024-01-25

## 2024-01-25 ASSESSMENT — PATIENT HEALTH QUESTIONNAIRE - PHQ9
SUM OF ALL RESPONSES TO PHQ9 QUESTIONS 1 & 2: 0
1. LITTLE INTEREST OR PLEASURE IN DOING THINGS: 0
SUM OF ALL RESPONSES TO PHQ QUESTIONS 1-9: 0
2. FEELING DOWN, DEPRESSED OR HOPELESS: 0
SUM OF ALL RESPONSES TO PHQ QUESTIONS 1-9: 0

## 2024-01-25 NOTE — PROGRESS NOTES
2024    Ronni Soto (:  1980) is a 43 y.o. male, here for a preventive medicine evaluation.    Patient Active Problem List   Diagnosis    Carpal tunnel syndrome of right wrist    Obesity (BMI 35.0-39.9 without comorbidity)    Elevated triglycerides with high cholesterol    Right foot pain    Migraines    Insomnia    Plantar fasciitis of right foot    Class 2 severe obesity due to excess calories with serious comorbidity and body mass index (BMI) of 39.0 to 39.9 in adult (HCC)    Hypertension    Varicose veins of right lower extremity with pain       Review of Systems   Constitutional:  Negative for activity change, appetite change, fatigue and fever.   HENT:  Negative for congestion, hearing loss, postnasal drip, rhinorrhea, sore throat, trouble swallowing and voice change.    Eyes:  Negative for photophobia, discharge, itching and visual disturbance.   Respiratory:  Negative for cough, shortness of breath and wheezing.    Cardiovascular:  Negative for chest pain, palpitations and leg swelling.   Gastrointestinal:  Negative for abdominal pain, constipation, diarrhea, nausea and vomiting.   Genitourinary:  Negative for difficulty urinating and dysuria.   Musculoskeletal:  Negative for arthralgias and back pain.   Skin:  Negative for rash.   Neurological:  Negative for dizziness, weakness, light-headedness and headaches.   Psychiatric/Behavioral:  Negative for sleep disturbance. The patient is not nervous/anxious.        Prior to Visit Medications    Medication Sig Taking? Authorizing Provider   atorvastatin (LIPITOR) 40 MG tablet Take 1 tablet by mouth daily Yes Isaias Elizalde DO   metFORMIN (GLUCOPHAGE) 1000 MG tablet Take 1 tablet by mouth 2 times daily (with meals) Yes Isaias Elizalde DO   lisinopril (PRINIVIL;ZESTRIL) 10 MG tablet Take 1 tablet by mouth daily Yes Isaias Elizalde DO   amLODIPine (NORVASC) 10 MG tablet Take 1 tablet by mouth daily Yes Isaias Elizalde DO

## 2024-01-25 NOTE — PATIENT INSTRUCTIONS
healthcare team, exercising most days of the week, and eating a sensible diet every day. It can be difficult to develop new eating and exercise habits after weight loss surgery, and you will have to work hard to stick to your goals.  Recovering from surgery and losing weight can be stressful and emotional, and it is important to have the support of family and friends. Working with a , therapist, or support group can help you through the ups and downs.  WHERE TO GET MORE INFORMATION -- Your healthcare provider is the best source of information for questions and concerns related to your medical problem.  This article will be updated as needed every four months on our Web site ([email or web address])

## 2024-01-27 ASSESSMENT — ENCOUNTER SYMPTOMS
SHORTNESS OF BREATH: 0
NAUSEA: 0
SORE THROAT: 0
CONSTIPATION: 0
EYE DISCHARGE: 0
VOMITING: 0
ABDOMINAL PAIN: 0
EYE ITCHING: 0
TROUBLE SWALLOWING: 0
VOICE CHANGE: 0
WHEEZING: 0
DIARRHEA: 0
BACK PAIN: 0
RHINORRHEA: 0
PHOTOPHOBIA: 0
COUGH: 0
COLOR CHANGE: 1

## 2024-01-27 NOTE — PROGRESS NOTES
Subjective:      Patient ID: Ronni Soto is a 43 y.o. male.    43-year-old gentleman with a past medical history of HTN, T2DM, HLD and obesity who is presenting today for follow-up and to discuss his chronic medical issues.  Patient also reports concerns about erectile dysfunction        Review of Systems   Constitutional:  Negative for activity change, appetite change and fever.   HENT:  Negative for congestion, hearing loss, postnasal drip, rhinorrhea, sore throat, trouble swallowing and voice change.    Eyes:  Negative for photophobia, discharge, itching and visual disturbance.   Respiratory:  Negative for cough and wheezing.    Cardiovascular:  Negative for leg swelling.   Gastrointestinal:  Negative for abdominal pain, constipation, diarrhea, nausea and vomiting.   Genitourinary:  Negative for difficulty urinating and dysuria.   Musculoskeletal:  Negative for arthralgias and back pain.   Skin:  Positive for color change. Negative for rash.   Neurological:  Negative for light-headedness.   Psychiatric/Behavioral:  Negative for sleep disturbance.        Objective: /81 (Site: Left Upper Arm, Position: Sitting, Cuff Size: Large Adult)   Pulse 93   Temp 97.7 °F (36.5 °C) (Temporal)   Resp 16   Ht 1.93 m (6' 4\")   Wt (!) 137.9 kg (304 lb)   SpO2 97%   BMI 37.00 kg/m²      Physical Exam  Vitals reviewed.   Constitutional:       General: He is not in acute distress.     Appearance: Normal appearance. He is obese. He is not ill-appearing, toxic-appearing or diaphoretic.   HENT:      Head: Normocephalic and atraumatic.      Right Ear: External ear normal.      Left Ear: External ear normal.   Eyes:      General:         Right eye: No discharge.         Left eye: No discharge.   Cardiovascular:      Rate and Rhythm: Normal rate and regular rhythm.      Pulses: Normal pulses.      Heart sounds: Normal heart sounds. No murmur heard.     No friction rub. No gallop.   Pulmonary:      Effort: Pulmonary effort is

## 2024-04-15 ENCOUNTER — PATIENT MESSAGE (OUTPATIENT)
Age: 44
End: 2024-04-15

## 2024-04-15 DIAGNOSIS — E11.65 TYPE 2 DIABETES MELLITUS WITH HYPERGLYCEMIA, WITHOUT LONG-TERM CURRENT USE OF INSULIN (HCC): Primary | ICD-10-CM

## 2024-04-19 ENCOUNTER — CLINICAL DOCUMENTATION (OUTPATIENT)
Age: 44
End: 2024-04-19

## 2024-04-19 DIAGNOSIS — I83.811 VARICOSE VEINS OF RIGHT LOWER EXTREMITY WITH PAIN: Primary | ICD-10-CM

## 2024-04-19 RX ORDER — TRAMADOL HYDROCHLORIDE 50 MG/1
50 TABLET ORAL 2 TIMES DAILY PRN
Qty: 6 TABLET | Refills: 0 | Status: SHIPPED | OUTPATIENT
Start: 2024-04-19 | End: 2024-04-22

## 2024-04-19 RX ORDER — DIAZEPAM 5 MG/1
TABLET ORAL
Qty: 2 TABLET | Refills: 0 | Status: SHIPPED | OUTPATIENT
Start: 2024-04-19 | End: 2024-04-29

## 2024-04-19 RX ORDER — CEPHALEXIN 500 MG/1
500 CAPSULE ORAL 2 TIMES DAILY
Qty: 14 CAPSULE | Refills: 0 | Status: SHIPPED | OUTPATIENT
Start: 2024-04-19 | End: 2024-04-26

## 2024-04-19 NOTE — TELEPHONE ENCOUNTER
I called Simeon Charles and I gave him the number to [facility] [phone].

## 2024-04-19 NOTE — TELEPHONE ENCOUNTER
From: Dr. Isaias Elizalde  To: Ronni Charles  Sent: 4/15/2024 7:25 AM EDT  Subject: Semaglutide.    Good morning Mr. Soto,    An order for semaglutide 0.5 mg weekly has been placed. I believe you have previously of 0.25 mg weekly. Hopefully the insurance will approve this. Let us know if you have any other concerns.

## 2024-04-22 ENCOUNTER — TELEPHONE (OUTPATIENT)
Age: 44
End: 2024-04-22

## 2024-04-22 DIAGNOSIS — I83.811 VARICOSE VEINS OF RIGHT LOWER EXTREMITY WITH PAIN: Primary | ICD-10-CM

## 2024-04-22 NOTE — TELEPHONE ENCOUNTER
Called and spoke to patient to remind him if his upcoming ablation procedure with Dr. Freitas for the right lower extremity Great Saphenous vein Radiofrequency ablation with Stab phlebectomy. All his prescription needed for this procedure has been sent to his pharmacy. Informed patient that we will call to do the consent forms the day before procedure, we will call between 4pm-5pm. Patient confirmed he has his thigh high compression stockings.     Also informed patient that we have another company, [employer] that we will be using for the ablation procedure and would like to know if patient was ok with it. Patient stated he is ok and is aware that the representative will be in the room during his procedure. Patient confirmed.

## 2024-04-26 ENCOUNTER — PROCEDURE VISIT (OUTPATIENT)
Age: 44
End: 2024-04-26

## 2024-04-26 VITALS
WEIGHT: 304 LBS | SYSTOLIC BLOOD PRESSURE: 120 MMHG | BODY MASS INDEX: 37.02 KG/M2 | OXYGEN SATURATION: 98 % | HEIGHT: 76 IN | HEART RATE: 74 BPM | DIASTOLIC BLOOD PRESSURE: 80 MMHG

## 2024-04-26 DIAGNOSIS — I83.811 VARICOSE VEINS OF RIGHT LOWER EXTREMITY WITH PAIN: Primary | ICD-10-CM

## 2024-04-26 NOTE — PROGRESS NOTES
[facility]    Date of Service: 4/26/2024    Surgeon: Aliyah Freitas MD PhD    Indication: Limb pain, painful varicose veins and documented venous insufficiency of the right lower extremity    Procedure:   Radiofrequency ablation of the right great saphenous vein (CPT 07767)  Microphlebectomies of varicose veins in the right great saphenous distribution, 12 stab incisions (CPT 65333)    Consent: Upon review of the patient's clinical course and symptoms, recommendation is made to proceed with radiofrequency ablation and microphlebectomies. The risks, benefits and alternatives were discussed with the patient prior to procedure and consent was obtained.     Anesthesia: Local infiltration of lidocaine, tumescent anesthesia and Valium 10 mg PO    Specimen removed: none    EBL: minimal     Complications: none immediately apparent      Description of Procedure in Detail:     After informed consent was obtained, the patient was transferred to the procedure suite and the lower extremity evaluated in the standing position. The location of prominent and symptomatic varicose veins was marked with indelible marker. The patient was actively involved in identifying the most bothersome clusters of varices. The patient was then positioned on the the table in supine position. The right leg was prepped and draped in the usual sterile fashion.     The skin was anesthetized with 1% Lidocaine. Ultrasound guided access was obtained at the below the knee level into the right great saphenous vein after the vein had been mapped with duplex ultrasound.  The micro puncture wire was advanced and sheath placed over the wire into the saphenous vein.  The RFA Closure catheter was then advanced under direct visualization to the saphenofemoral junction.  The probes of the catheter were opened and the catheter was withdrawn to approximately 4 cm distal to the junction.  Continuous irrigation through the catheter was

## 2024-05-02 ENCOUNTER — NURSE ONLY (OUTPATIENT)
Age: 44
End: 2024-05-02

## 2024-05-02 DIAGNOSIS — I83.811 VARICOSE VEINS OF RIGHT LOWER EXTREMITY WITH PAIN: Primary | ICD-10-CM

## 2024-05-02 NOTE — PROGRESS NOTES
Pt RTC for incision check post phlebectomy. All incisions are intact and mild bruising. No pain or swelling.

## 2024-05-16 ENCOUNTER — OFFICE VISIT (OUTPATIENT)
Age: 44
End: 2024-05-16
Payer: COMMERCIAL

## 2024-05-16 VITALS
OXYGEN SATURATION: 97 % | HEART RATE: 72 BPM | HEIGHT: 76 IN | SYSTOLIC BLOOD PRESSURE: 159 MMHG | DIASTOLIC BLOOD PRESSURE: 87 MMHG | WEIGHT: 304 LBS | TEMPERATURE: 98.4 F | BODY MASS INDEX: 37.02 KG/M2

## 2024-05-16 DIAGNOSIS — K43.2 INCISIONAL HERNIA, WITHOUT OBSTRUCTION OR GANGRENE: Primary | ICD-10-CM

## 2024-05-16 DIAGNOSIS — I83.811 VARICOSE VEINS OF RIGHT LOWER EXTREMITY WITH PAIN: ICD-10-CM

## 2024-05-16 DIAGNOSIS — E66.9 OBESITY (BMI 35.0-39.9 WITHOUT COMORBIDITY): ICD-10-CM

## 2024-05-16 PROCEDURE — 3079F DIAST BP 80-89 MM HG: CPT | Performed by: SURGERY

## 2024-05-16 PROCEDURE — 99214 OFFICE O/P EST MOD 30 MIN: CPT | Performed by: SURGERY

## 2024-05-16 PROCEDURE — 3077F SYST BP >= 140 MM HG: CPT | Performed by: SURGERY

## 2024-05-16 NOTE — PROGRESS NOTES
Ronni Soto is a 43 y.o. male (: 1980) presenting to address:    Chief Complaint   Patient presents with    Follow-up     Incisional hernia x2/ready to discuss surgery       Medication list and allergies have been reviewed with Ronni Soto and updated as of today's date.     I have gone over all Medical, Surgical and Social History with Ronni Soto and updated/added the information accordingly.       1. Have you been to the ER, Urgent Care or Hospitalized since your last visit? No          2. Have you followed up with your PCP or any other Physicians since your procedure/ last office visit?   No    
pain     15 years ago stepped awkward heard a pop    Varicose veins of right lower extremity with pain 2023      Past Surgical History:   Procedure Laterality Date    APPENDECTOMY        Social History     Tobacco Use    Smoking status: Former     Current packs/day: 0.00     Types: Cigarettes     Quit date: 3/1/2017     Years since quittin.2    Smokeless tobacco: Never   Substance Use Topics    Alcohol use: Yes     Comment: occ      Social History     Tobacco Use   Smoking Status Former    Current packs/day: 0.00    Types: Cigarettes    Quit date: 3/1/2017    Years since quittin.2   Smokeless Tobacco Never     Family History   Problem Relation Age of Onset    Diabetes Paternal Grandmother     Heart Disease Paternal Grandmother     Hypertension Paternal Grandmother     High Cholesterol Paternal Grandmother     Heart Disease Maternal Grandfather     High Cholesterol Maternal Grandfather     Diabetes Maternal Grandfather     High Blood Pressure Maternal Grandfather     High Cholesterol Father     Diabetes Father     Heart Disease Father     Hypertension Father     High Blood Pressure Father     Stroke Paternal Grandfather     Heart Disease Paternal Grandfather     Arthritis Paternal Grandfather     High Blood Pressure Paternal Grandfather       Current Outpatient Medications   Medication Sig    [START ON 2024] semaglutide, 2 MG/DOSE, (OZEMPIC, 2 MG/DOSE,) 8 MG/3ML SOPN sc injection Inject 2 mg into the skin every 7 days    glipiZIDE (GLUCOTROL XL) 5 MG extended release tablet Take 1 tablet by mouth daily    atorvastatin (LIPITOR) 40 MG tablet Take 1 tablet by mouth daily    lisinopril (PRINIVIL;ZESTRIL) 10 MG tablet Take 1 tablet by mouth daily    Semaglutide,0.25 or 0.5MG/DOS, 2 MG/3ML SOPN Inject 0.5 mg into the skin every 7 days    sildenafil (VIAGRA) 50 MG tablet Take 1 tablet by mouth daily as needed for Erectile Dysfunction Max 100 mg per 24 hrs    metFORMIN (GLUCOPHAGE) 1000 MG tablet Take 1

## 2024-05-17 NOTE — PROGRESS NOTES
Ronni Soto    Chief Complaint   Patient presents with    Varicose veins of right lower extremity with pain     2 Week Follow Up       History and Physical    Ronni Soto is a 44 y.o. male with venous insufficiency     he returns in follow up after R GSV RF and stab phlebectomies on 4/26/24. This procedure was originally performed for painful varicose veins.     Today the patient reports resolution of his symptoms. He states edema has resolved since the surgery. Paresthesias have improved significantly.       The most recent PVL was reviewed and discussed with the patient. This shows successful closure of the right great saphenous vein without evidence of DVT.   Interpretation Summary         Status post mechanical occlusion present of the right greater saphenous thigh vein beginning 1.4 cm away from the saphenofemoral junction. The mechanical occlusion class is T2a: No discernable shrinkage, vein incompressible under ultrasound probe.    Patent right common femoral vein with no evidence of thrombus and acceptable hemodynamics.     Procedure Details    A gray scale, color Doppler imaging and spectral Doppler analysis ultrasound was performed. During the study longitudinal and transverse views were obtained. Pulsed wave doppler was performed.     Lower Extremity Venous Findings    Right Lower Venous    Common Femoral Vein: Patent, normal phasicity, spontaneous, normal augmentation, compressible.       There is a mechanical occlusion present in the greater saphenous thigh vein(s). The mechanical occlusion class is T2a: No discernable shrinkage, vein incompressible under ultrasound probe.     Closure begins 1.4 cm away from saphenofemoral junction.       Past Medical History:   Diagnosis Date    Elevated triglycerides with high cholesterol [id number]    500-600    H/O fracture of foot     right 4th5th metatarsal    Insomnia     years, trouble falling asleep, related to ruminations, trouble staying asleep

## 2024-05-22 ENCOUNTER — OFFICE VISIT (OUTPATIENT)
Age: 44
End: 2024-05-22
Payer: COMMERCIAL

## 2024-05-22 VITALS
HEIGHT: 76 IN | BODY MASS INDEX: 35.92 KG/M2 | SYSTOLIC BLOOD PRESSURE: 118 MMHG | OXYGEN SATURATION: 99 % | WEIGHT: 295 LBS | DIASTOLIC BLOOD PRESSURE: 80 MMHG | HEART RATE: 67 BPM

## 2024-05-22 DIAGNOSIS — I83.811 VARICOSE VEINS OF RIGHT LOWER EXTREMITY WITH PAIN: Primary | ICD-10-CM

## 2024-05-22 PROCEDURE — 3079F DIAST BP 80-89 MM HG: CPT | Performed by: SURGERY

## 2024-05-22 PROCEDURE — 99212 OFFICE O/P EST SF 10 MIN: CPT | Performed by: SURGERY

## 2024-05-22 PROCEDURE — 3074F SYST BP LT 130 MM HG: CPT | Performed by: SURGERY

## 2024-07-30 ENCOUNTER — HOSPITAL ENCOUNTER (OUTPATIENT)
Facility: HOSPITAL | Age: 44
Discharge: HOME | End: 2024-08-02
Attending: SURGERY
Payer: COMMERCIAL

## 2024-07-30 DIAGNOSIS — K43.2 INCISIONAL HERNIA, WITHOUT OBSTRUCTION OR GANGRENE: ICD-10-CM

## 2024-07-30 PROCEDURE — 74176 CT ABD & PELVIS W/O CONTRAST: CPT

## 2024-08-07 ENCOUNTER — TELEPHONE (OUTPATIENT)
Age: 44
End: 2024-08-07

## 2024-08-07 NOTE — TELEPHONE ENCOUNTER
Unsuccessful attempt (voicemail full - unable to leave message) to contact Mr. Soto to schedule an appointment with Dr. Adamson to discuss CT results.

## 2024-08-15 ENCOUNTER — OFFICE VISIT (OUTPATIENT)
Age: 44
End: 2024-08-15
Payer: COMMERCIAL

## 2024-08-15 VITALS
OXYGEN SATURATION: 95 % | TEMPERATURE: 97.7 F | SYSTOLIC BLOOD PRESSURE: 141 MMHG | DIASTOLIC BLOOD PRESSURE: 84 MMHG | WEIGHT: 302 LBS | HEART RATE: 72 BPM | BODY MASS INDEX: 36.77 KG/M2 | HEIGHT: 76 IN

## 2024-08-15 DIAGNOSIS — K43.2 INCISIONAL HERNIA, WITHOUT OBSTRUCTION OR GANGRENE: Primary | ICD-10-CM

## 2024-08-15 DIAGNOSIS — K42.9 UMBILICAL HERNIA WITHOUT OBSTRUCTION AND WITHOUT GANGRENE: ICD-10-CM

## 2024-08-15 DIAGNOSIS — E66.9 OBESITY (BMI 35.0-39.9 WITHOUT COMORBIDITY): ICD-10-CM

## 2024-08-15 PROCEDURE — 3077F SYST BP >= 140 MM HG: CPT | Performed by: SURGERY

## 2024-08-15 PROCEDURE — 99214 OFFICE O/P EST MOD 30 MIN: CPT | Performed by: SURGERY

## 2024-08-15 PROCEDURE — 3079F DIAST BP 80-89 MM HG: CPT | Performed by: SURGERY

## 2024-08-15 NOTE — PROGRESS NOTES
General Surgery Consult      Ronni Soto  Admit date: (Not on file)    MRN: [id number]     : 1980     Age: 44 y.o.        Attending Physician: Chyna Adamson MD, [facility]      History of Present Illness:     Ronni Soto is a 44 y.o. male who is well-known to me and is here for follow-up on his abdominal wall hernia and to discuss the CT scan results.  The patient has a history of morbid obesity and when I saw him before the plan was for him to try to lose weight and unfortunately he did gain 2 pounds instead of losing weight however he is really trying hard.  His wife is going to undergo bariatric surgery in the next month he has been very busy as well.  He stated that the hernia is causing some discomfort and he would like it to be repaired but he understand that he needs to lose some weight before repairing it if possible.  His CT scan of abdomen pelvis showed paraumbilical and umbilical hernia containing fatty tissue with no evidence of any bowel inside the sac.     Patient Active Problem List    Diagnosis Date Noted    Varicose veins of right lower extremity with pain 2023    Class 2 severe obesity due to excess calories with serious comorbidity and body mass index (BMI) of 39.0 to 39.9 in adult (HCC) 2023    Hypertension 2023    Carpal tunnel syndrome of right wrist 2017    Plantar fasciitis of right foot 2017    Obesity (BMI 35.0-39.9 without comorbidity)     Elevated triglycerides with high cholesterol     Right foot pain     Migraines     Insomnia      Past Medical History:   Diagnosis Date    Elevated triglycerides with high cholesterol [id number]    500-600    H/O fracture of foot     right 4th5th metatarsal    Insomnia     years, trouble falling asleep, related to ruminations, trouble staying asleep    Migraines     childhood, on average every 2 weeks    Obesity (BMI 35.0-39.9 without comorbidity)     Right foot pain     15 years ago stepped awkward heard a pop

## 2024-10-27 DIAGNOSIS — E11.65 UNCONTROLLED TYPE 2 DIABETES MELLITUS WITH HYPERGLYCEMIA (HCC): ICD-10-CM

## 2024-10-28 RX ORDER — GLIPIZIDE 5 MG/1
5 TABLET, FILM COATED, EXTENDED RELEASE ORAL DAILY
Qty: 90 TABLET | Refills: 0 | Status: SHIPPED | OUTPATIENT
Start: 2024-10-28

## 2024-10-28 RX ORDER — AMLODIPINE BESYLATE 10 MG/1
10 TABLET ORAL DAILY
Qty: 90 TABLET | Refills: 0 | Status: SHIPPED | OUTPATIENT
Start: 2024-10-28

## 2024-10-28 NOTE — TELEPHONE ENCOUNTER
Attempted to contact patient to schedule a required in office visit before further refills can be issued. No answer, left voice mail to contact office.   Sent [employer] link      T2DM, HTN, Obesity

## 2024-10-28 NOTE — TELEPHONE ENCOUNTER
Good morning.  Please advise patient that he is due for a follow-up appointment, which should be scheduled before current refill runs out. Thanks

## 2025-03-12 RX ORDER — AMLODIPINE BESYLATE 10 MG/1
10 TABLET ORAL DAILY
Qty: 90 TABLET | Refills: 0 | OUTPATIENT
Start: 2025-03-12

## 2025-05-29 ENCOUNTER — OFFICE VISIT (OUTPATIENT)
Facility: CLINIC | Age: 45
End: 2025-05-29
Payer: COMMERCIAL

## 2025-05-29 VITALS
HEIGHT: 76 IN | TEMPERATURE: 97.7 F | DIASTOLIC BLOOD PRESSURE: 87 MMHG | SYSTOLIC BLOOD PRESSURE: 134 MMHG | HEART RATE: 89 BPM | RESPIRATION RATE: 18 BRPM | OXYGEN SATURATION: 98 % | WEIGHT: 299 LBS | BODY MASS INDEX: 36.41 KG/M2

## 2025-05-29 DIAGNOSIS — Z79.4 TYPE 2 DIABETES MELLITUS WITH HYPERGLYCEMIA, WITH LONG-TERM CURRENT USE OF INSULIN (HCC): Primary | ICD-10-CM

## 2025-05-29 DIAGNOSIS — I10 ESSENTIAL (PRIMARY) HYPERTENSION: ICD-10-CM

## 2025-05-29 DIAGNOSIS — E78.5 HYPERLIPIDEMIA, UNSPECIFIED HYPERLIPIDEMIA TYPE: ICD-10-CM

## 2025-05-29 DIAGNOSIS — Z12.11 SCREENING FOR COLON CANCER: ICD-10-CM

## 2025-05-29 DIAGNOSIS — E11.65 TYPE 2 DIABETES MELLITUS WITH HYPERGLYCEMIA, WITH LONG-TERM CURRENT USE OF INSULIN (HCC): Primary | ICD-10-CM

## 2025-05-29 DIAGNOSIS — E66.812 CLASS 2 SEVERE OBESITY DUE TO EXCESS CALORIES WITH SERIOUS COMORBIDITY AND BODY MASS INDEX (BMI) OF 36.0 TO 36.9 IN ADULT (HCC): ICD-10-CM

## 2025-05-29 DIAGNOSIS — E66.01 CLASS 2 SEVERE OBESITY DUE TO EXCESS CALORIES WITH SERIOUS COMORBIDITY AND BODY MASS INDEX (BMI) OF 36.0 TO 36.9 IN ADULT (HCC): ICD-10-CM

## 2025-05-29 LAB — HBA1C MFR BLD: 11.6 %

## 2025-05-29 PROCEDURE — 3075F SYST BP GE 130 - 139MM HG: CPT | Performed by: STUDENT IN AN ORGANIZED HEALTH CARE EDUCATION/TRAINING PROGRAM

## 2025-05-29 PROCEDURE — 3046F HEMOGLOBIN A1C LEVEL >9.0%: CPT | Performed by: STUDENT IN AN ORGANIZED HEALTH CARE EDUCATION/TRAINING PROGRAM

## 2025-05-29 PROCEDURE — 99215 OFFICE O/P EST HI 40 MIN: CPT | Performed by: STUDENT IN AN ORGANIZED HEALTH CARE EDUCATION/TRAINING PROGRAM

## 2025-05-29 PROCEDURE — 3079F DIAST BP 80-89 MM HG: CPT | Performed by: STUDENT IN AN ORGANIZED HEALTH CARE EDUCATION/TRAINING PROGRAM

## 2025-05-29 PROCEDURE — 83036 HEMOGLOBIN GLYCOSYLATED A1C: CPT | Performed by: STUDENT IN AN ORGANIZED HEALTH CARE EDUCATION/TRAINING PROGRAM

## 2025-05-29 RX ORDER — INSULIN GLARGINE-YFGN 100 [IU]/ML
15 INJECTION, SOLUTION SUBCUTANEOUS NIGHTLY
Qty: 10 ML | Refills: 1 | Status: SHIPPED | OUTPATIENT
Start: 2025-05-29

## 2025-05-29 SDOH — ECONOMIC STABILITY: FOOD INSECURITY: WITHIN THE PAST 12 MONTHS, YOU WORRIED THAT YOUR FOOD WOULD RUN OUT BEFORE YOU GOT MONEY TO BUY MORE.: PATIENT DECLINED

## 2025-05-29 SDOH — ECONOMIC STABILITY: FOOD INSECURITY: WITHIN THE PAST 12 MONTHS, THE FOOD YOU BOUGHT JUST DIDN'T LAST AND YOU DIDN'T HAVE MONEY TO GET MORE.: PATIENT DECLINED

## 2025-05-29 SDOH — ECONOMIC STABILITY: INCOME INSECURITY: IN THE LAST 12 MONTHS, WAS THERE A TIME WHEN YOU WERE NOT ABLE TO PAY THE MORTGAGE OR RENT ON TIME?: PATIENT DECLINED

## 2025-05-29 SDOH — ECONOMIC STABILITY: TRANSPORTATION INSECURITY
IN THE PAST 12 MONTHS, HAS THE LACK OF TRANSPORTATION KEPT YOU FROM MEDICAL APPOINTMENTS OR FROM GETTING MEDICATIONS?: PATIENT DECLINED

## 2025-05-29 ASSESSMENT — ENCOUNTER SYMPTOMS
NAUSEA: 0
BACK PAIN: 0
SORE THROAT: 0
WHEEZING: 0
EYE DISCHARGE: 0
SHORTNESS OF BREATH: 0
DIARRHEA: 0
EYE ITCHING: 0
TROUBLE SWALLOWING: 0
PHOTOPHOBIA: 0
VOICE CHANGE: 0
VOMITING: 0
ABDOMINAL PAIN: 0
RHINORRHEA: 0
COUGH: 0
CONSTIPATION: 0

## 2025-05-29 ASSESSMENT — PATIENT HEALTH QUESTIONNAIRE - PHQ9
1. LITTLE INTEREST OR PLEASURE IN DOING THINGS: NOT AT ALL
2. FEELING DOWN, DEPRESSED OR HOPELESS: NOT AT ALL
SUM OF ALL RESPONSES TO PHQ QUESTIONS 1-9: 0

## 2025-05-29 NOTE — PROGRESS NOTES
\"Have you been to the ER, urgent care clinic since your last visit?  Hospitalized since your last visit?\"    NO    “Have you seen or consulted any other health care providers outside our system since your last visit?”    NO    “Have you had a colorectal cancer screening such as a colonoscopy/FIT/Cologuard?    NO    No colonoscopy on file  No cologuard on file  No FIT/FOBT on file   No flexible sigmoidoscopy on file     “Have you had a diabetic eye exam?”    YES - Where: [facility]; 2/2025 Nurse/CMA to request most recent records if not in the chart     No diabetic eye exam on file       Click Here for Release of Records Request  
administered by his wife, but notes no significant improvement in his condition. He has previously used insulin but expresses reluctance to self-administer it. He monitors his blood glucose levels intermittently, typically in the morning or evening, but notes that these readings do not always correlate with his symptoms. He recalls that when he initially started Ozempic, he experienced weight loss and appetite suppression, which improved his blood glucose control. However, a lapse in insurance coverage led to a month-long discontinuation of the medication, after which he resumed it without any noticeable benefits. He has been making dietary modifications, including reducing junk food intake and increasing consumption of fresh vegetables and fruits, but questions whether his persistently high blood glucose levels are hindering his weight loss efforts. He acknowledges a lack of regular exercise. He is currently on metformin 1000 mg and glipizide, with approximately 30 to 40 days' supply remaining of each.    He is on lisinopril and Avapro for blood pressure management, with about 30 to 40 days' supply remaining of each.    He is on Lipitor for cholesterol management.    SOCIAL HISTORY  He has 2 children aged 18 and 20.    FAMILY HISTORY  His father was diabetic. No family history of colon cancer.    Review of Systems   Constitutional:  Negative for activity change, appetite change, fatigue and fever.   HENT:  Negative for congestion, hearing loss, postnasal drip, rhinorrhea, sore throat, trouble swallowing and voice change.    Eyes:  Negative for photophobia, discharge, itching and visual disturbance.   Respiratory:  Negative for cough, shortness of breath and wheezing.    Cardiovascular:  Negative for chest pain, palpitations and leg swelling.   Gastrointestinal:  Negative for abdominal pain, constipation, diarrhea, nausea and vomiting.   Genitourinary:  Negative for difficulty urinating and dysuria.

## 2025-05-29 NOTE — PATIENT INSTRUCTIONS
stays in the blood instead. This can cause high blood sugar levels. A person has diabetes when the blood sugar stays too high too much of the time. Over time, diabetes can lead to diseases of the heart, blood vessels, nerves, kidneys, and eyes.  You may be able to control your blood sugar by losing weight, eating a healthy diet, and getting daily exercise. You may also have to take insulin or other diabetes medicine.  Follow-up care is a key part of your treatment and safety. Be sure to make and go to all appointments. Call your doctor if you are having problems. It's also a good idea to know your test results and keep a list of the medicines you take.  How can you care for yourself at home?  Keep your blood sugar at a target level (which you set with your doctor).  Carbohydrate--the body's main source of fuel--affects blood sugar more than any other nutrient. Carbohydrate is in fruits, vegetables, milk, and yogurt. It also is in breads, cereals, vegetables such as potatoes and corn, and sugary foods such as candy and cakes. Follow your meal plan to know how much carbohydrate to eat at each meal and snack.  Aim for 30 minutes of exercise on most, preferably all, days of the week. Walking is a good choice. You also may want to do other activities, such as running, swimming, cycling, or playing tennis or team sports. Try to do muscle-strengthening exercises at least 2 times a week.  Take your medicines exactly as prescribed. Call your doctor if you think you are having a problem with your medicine. You will get more details on the specific medicines your doctor prescribes.  Check your blood sugar as often as your doctor recommends. It is important to keep track of any symptoms you have, such as low blood sugar. Also tell your doctor if you have any changes in your activities, diet, or insulin use.  Talk to your doctor before you start taking aspirin every day. Aspirin can help certain people lower their risk of a

## 2025-06-02 DIAGNOSIS — I10 ESSENTIAL (PRIMARY) HYPERTENSION: ICD-10-CM

## 2025-06-02 DIAGNOSIS — E11.65 TYPE 2 DIABETES MELLITUS WITH HYPERGLYCEMIA (HCC): ICD-10-CM

## 2025-06-02 DIAGNOSIS — E11.65 UNCONTROLLED TYPE 2 DIABETES MELLITUS WITH HYPERGLYCEMIA (HCC): ICD-10-CM

## 2025-06-04 RX ORDER — GLIPIZIDE 5 MG/1
5 TABLET, FILM COATED, EXTENDED RELEASE ORAL DAILY
Qty: 90 TABLET | Refills: 0 | Status: SHIPPED | OUTPATIENT
Start: 2025-06-04

## 2025-06-04 RX ORDER — LISINOPRIL 10 MG/1
10 TABLET ORAL DAILY
Qty: 30 TABLET | Refills: 2 | Status: SHIPPED | OUTPATIENT
Start: 2025-06-04

## 2025-06-06 DIAGNOSIS — E11.65 UNCONTROLLED TYPE 2 DIABETES MELLITUS WITH HYPERGLYCEMIA (HCC): Primary | ICD-10-CM

## 2025-06-06 RX ORDER — INSULIN GLARGINE 100 [IU]/ML
15 INJECTION, SOLUTION SUBCUTANEOUS NIGHTLY
Qty: 6 ML | Refills: 2 | Status: SHIPPED | OUTPATIENT
Start: 2025-06-06 | End: 2025-10-04

## 2025-06-11 ENCOUNTER — CLINICAL DOCUMENTATION (OUTPATIENT)
Facility: CLINIC | Age: 45
End: 2025-06-11

## 2025-06-11 NOTE — PROGRESS NOTES
Approved  PA Detail   Prior authorization approved  Payer: [employer] Patient's Payer Case ID: [id number]    [phone]  Note from payer: CaseId:[id number];Status:Approved;Review Type:Prior Auth;Coverage Start Date:05/30/2025;Coverage End Date:02/04/2026;  Approval Details    Authorized from May 30, 2025 to February 4, 2026  Electronic appeal: Not supported  View History  Medication Being Authorized    tirzepatide-weight management (ZEPBOUND) 2.5 MG/0.5ML SOAJ subCUTAneous auto-injector pen  Inject 2.5 mg into the skin every 7 days  Dispense: 2 mL Refills: 0   Start: 5/29/2025   Class: Normal Diagnoses: Type 2 diabetes mellitus with hyperglycemia, with long-term current use of insulin (HCC)   This order has been released to its destination.  To be filled at: [facility]/pharmacy #[id number] - [place], [place] - [address] P [phone]  F [phone]

## 2025-06-13 DIAGNOSIS — E11.65 UNCONTROLLED TYPE 2 DIABETES MELLITUS WITH HYPERGLYCEMIA (HCC): Primary | ICD-10-CM

## 2025-06-13 RX ORDER — CALCIUM CITRATE/VITAMIN D3 200MG-6.25
1 TABLET ORAL 3 TIMES DAILY
Qty: 200 EACH | Refills: 5 | Status: SHIPPED | OUTPATIENT
Start: 2025-06-13 | End: 2025-12-10

## 2025-06-17 LAB — NONINV COLON CA DNA+OCC BLD SCRN STL QL: NEGATIVE

## 2025-06-20 ENCOUNTER — HOSPITAL ENCOUNTER (OUTPATIENT)
Facility: HOSPITAL | Age: 45
Setting detail: SPECIMEN
Discharge: HOME | End: 2025-06-23
Payer: COMMERCIAL

## 2025-06-20 DIAGNOSIS — E11.65 TYPE 2 DIABETES MELLITUS WITH HYPERGLYCEMIA, WITH LONG-TERM CURRENT USE OF INSULIN (HCC): ICD-10-CM

## 2025-06-20 DIAGNOSIS — Z79.4 TYPE 2 DIABETES MELLITUS WITH HYPERGLYCEMIA, WITH LONG-TERM CURRENT USE OF INSULIN (HCC): ICD-10-CM

## 2025-06-20 DIAGNOSIS — E78.5 HYPERLIPIDEMIA, UNSPECIFIED HYPERLIPIDEMIA TYPE: ICD-10-CM

## 2025-06-20 DIAGNOSIS — I10 ESSENTIAL (PRIMARY) HYPERTENSION: ICD-10-CM

## 2025-06-20 LAB
ALBUMIN SERPL-MCNC: 3.9 G/DL (ref 3.4–5)
ALBUMIN/GLOB SERPL: 1.5 (ref 0.8–1.7)
ALP SERPL-CCNC: 67 U/L (ref 45–117)
ALT SERPL-CCNC: 55 U/L (ref 10–50)
ANION GAP SERPL CALC-SCNC: 10 MMOL/L (ref 3–18)
AST SERPL-CCNC: 27 U/L (ref 10–38)
BILIRUB SERPL-MCNC: 0.5 MG/DL (ref 0.2–1)
BUN SERPL-MCNC: 16 MG/DL (ref 6–23)
BUN/CREAT SERPL: 18 (ref 12–20)
CALCIUM SERPL-MCNC: 9.8 MG/DL (ref 8.5–10.1)
CHLORIDE SERPL-SCNC: 102 MMOL/L (ref 98–107)
CHOLEST SERPL-MCNC: 165 MG/DL
CO2 SERPL-SCNC: 27 MMOL/L (ref 21–32)
CREAT SERPL-MCNC: 0.88 MG/DL (ref 0.6–1.3)
GLOBULIN SER CALC-MCNC: 2.5 G/DL (ref 2–4)
GLUCOSE SERPL-MCNC: 203 MG/DL (ref 74–108)
HDLC SERPL-MCNC: 31 MG/DL (ref 40–60)
HDLC SERPL: 5.4 (ref 0–5)
LDLC SERPL CALC-MCNC: 73 MG/DL (ref 0–100)
POTASSIUM SERPL-SCNC: 4.4 MMOL/L (ref 3.5–5.5)
PROT SERPL-MCNC: 6.4 G/DL (ref 6.4–8.2)
SODIUM SERPL-SCNC: 140 MMOL/L (ref 136–145)
TRIGL SERPL-MCNC: 308 MG/DL (ref 0–150)
VLDLC SERPL CALC-MCNC: 62 MG/DL

## 2025-06-20 PROCEDURE — 80061 LIPID PANEL: CPT

## 2025-06-20 PROCEDURE — 36415 COLL VENOUS BLD VENIPUNCTURE: CPT

## 2025-06-20 PROCEDURE — 80053 COMPREHEN METABOLIC PANEL: CPT

## 2025-06-23 ENCOUNTER — PATIENT MESSAGE (OUTPATIENT)
Facility: CLINIC | Age: 45
End: 2025-06-23

## 2025-06-23 ENCOUNTER — CLINICAL DOCUMENTATION (OUTPATIENT)
Facility: CLINIC | Age: 45
End: 2025-06-23

## 2025-06-25 NOTE — TELEPHONE ENCOUNTER
I spoke with someone from [facility] pharmacy they should have Zepbound in stock today around 3:30. Patient has been notified.

## 2025-06-27 ENCOUNTER — HOSPITAL ENCOUNTER (OUTPATIENT)
Facility: HOSPITAL | Age: 45
Setting detail: SPECIMEN
Discharge: HOME | End: 2025-06-30
Payer: COMMERCIAL

## 2025-06-27 ENCOUNTER — OFFICE VISIT (OUTPATIENT)
Facility: CLINIC | Age: 45
End: 2025-06-27
Payer: COMMERCIAL

## 2025-06-27 VITALS
HEIGHT: 76 IN | WEIGHT: 303 LBS | OXYGEN SATURATION: 98 % | RESPIRATION RATE: 18 BRPM | TEMPERATURE: 96.8 F | DIASTOLIC BLOOD PRESSURE: 78 MMHG | HEART RATE: 93 BPM | SYSTOLIC BLOOD PRESSURE: 121 MMHG | BODY MASS INDEX: 36.9 KG/M2

## 2025-06-27 DIAGNOSIS — I10 ESSENTIAL (PRIMARY) HYPERTENSION: ICD-10-CM

## 2025-06-27 DIAGNOSIS — E11.65 UNCONTROLLED TYPE 2 DIABETES MELLITUS WITH HYPERGLYCEMIA (HCC): ICD-10-CM

## 2025-06-27 DIAGNOSIS — E78.5 HYPERLIPIDEMIA, UNSPECIFIED HYPERLIPIDEMIA TYPE: ICD-10-CM

## 2025-06-27 DIAGNOSIS — E66.01 CLASS 2 SEVERE OBESITY DUE TO EXCESS CALORIES WITH SERIOUS COMORBIDITY AND BODY MASS INDEX (BMI) OF 36.0 TO 36.9 IN ADULT (HCC): ICD-10-CM

## 2025-06-27 DIAGNOSIS — E11.65 UNCONTROLLED TYPE 2 DIABETES MELLITUS WITH HYPERGLYCEMIA (HCC): Primary | ICD-10-CM

## 2025-06-27 DIAGNOSIS — E66.812 CLASS 2 SEVERE OBESITY DUE TO EXCESS CALORIES WITH SERIOUS COMORBIDITY AND BODY MASS INDEX (BMI) OF 36.0 TO 36.9 IN ADULT (HCC): ICD-10-CM

## 2025-06-27 LAB
CREAT UR-MCNC: 131 MG/DL (ref 30–125)
MICROALBUMIN UR-MCNC: <1.2 MG/DL (ref 0–3)
MICROALBUMIN/CREAT UR-RTO: ABNORMAL MG/G (ref 0–30)

## 2025-06-27 PROCEDURE — 3074F SYST BP LT 130 MM HG: CPT | Performed by: STUDENT IN AN ORGANIZED HEALTH CARE EDUCATION/TRAINING PROGRAM

## 2025-06-27 PROCEDURE — 3078F DIAST BP <80 MM HG: CPT | Performed by: STUDENT IN AN ORGANIZED HEALTH CARE EDUCATION/TRAINING PROGRAM

## 2025-06-27 PROCEDURE — 82043 UR ALBUMIN QUANTITATIVE: CPT

## 2025-06-27 PROCEDURE — 3046F HEMOGLOBIN A1C LEVEL >9.0%: CPT | Performed by: STUDENT IN AN ORGANIZED HEALTH CARE EDUCATION/TRAINING PROGRAM

## 2025-06-27 PROCEDURE — 99214 OFFICE O/P EST MOD 30 MIN: CPT | Performed by: STUDENT IN AN ORGANIZED HEALTH CARE EDUCATION/TRAINING PROGRAM

## 2025-06-27 PROCEDURE — 82570 ASSAY OF URINE CREATININE: CPT

## 2025-06-27 ASSESSMENT — ENCOUNTER SYMPTOMS
DIARRHEA: 0
EYE ITCHING: 0
EYE DISCHARGE: 0
VOICE CHANGE: 0
RHINORRHEA: 0
BACK PAIN: 0
COUGH: 0
SORE THROAT: 0
TROUBLE SWALLOWING: 0
SHORTNESS OF BREATH: 0
WHEEZING: 0
ABDOMINAL PAIN: 0
PHOTOPHOBIA: 0
CONSTIPATION: 0
NAUSEA: 0
VOMITING: 0

## 2025-06-27 ASSESSMENT — PATIENT HEALTH QUESTIONNAIRE - PHQ9
1. LITTLE INTEREST OR PLEASURE IN DOING THINGS: NOT AT ALL
SUM OF ALL RESPONSES TO PHQ QUESTIONS 1-9: 0
SUM OF ALL RESPONSES TO PHQ QUESTIONS 1-9: 0
2. FEELING DOWN, DEPRESSED OR HOPELESS: NOT AT ALL
SUM OF ALL RESPONSES TO PHQ QUESTIONS 1-9: 0
SUM OF ALL RESPONSES TO PHQ QUESTIONS 1-9: 0

## 2025-06-27 NOTE — PROGRESS NOTES
Ronni Soto (:  1980) is a 45 y.o. male, Established patient, here for evaluation of the following chief complaint(s):  Diabetes and Hypertension         Assessment & Plan  1. Diabetes mellitus  - Blood glucose levels have shown improvement but remain above the target range  - Current regimen: insulin 15 units, metformin 1000 mg twice daily, glipizide 5 mg  - Increase insulin dosage by 3 units to a total of 18 units  - Commence Zepbound 2.5 mg weekly upon receipt  - Conduct urine test today to monitor for proteinuria  - Expected weight loss of approximately 8 pounds by the next visit    2. Hyperlipidemia  - Lipid panel results: total cholesterol 165, triglycerides 308, LDL 73  - Target LDL for diabetic patients is below 70  - Currently on Lipitor 40 mg  - Lipid panel will be repeated during the next A1c test to monitor progress    3. Hypertension.  - His blood pressure is within normal limits 120/78  - He will continue his current antihypertensive medications, including lisinopril 10 mg daily, addition to lifestyle modification to diet (Dash/Mediterranean diets suggested) and exercise.    Follow-up  - Patient will follow up in 1 month    Results  - Labs:    - Morning blood sugar: 221 mg/dL    - Total cholesterol: 165 mg/dL    - Triglycerides: 308 mg/dL    - LDL: 73 mg/dL  1. Uncontrolled type 2 diabetes mellitus with hyperglycemia (HCC)  -     Albumin/Creatinine Ratio, Urine; Future    Return in about 4 weeks (around 2025) for T2DM, HTN.       Subjective   History of Present Illness  The patient presents for evaluation of diabetes mellitus, hypertension, obesity and hyperlipidemia.    He reports adhering to the prescribed regimen, which has resulted in a slight weight loss. Blood glucose levels have decreased from around 300 to between 200 and 225, with a reading of 221 this morning. Currently, he is on a regimen of 15 units of insulin, metformin 1000 mg twice daily, and glipizide 5 mg. He has not

## 2025-06-27 NOTE — PROGRESS NOTES
\"Have you been to the ER, urgent care clinic since your last visit?  Hospitalized since your last visit?\"    NO    “Have you seen or consulted any other health care providers outside our system since your last visit?”    NO    “Have you had a diabetic eye exam?”    YES - Where: 1/2025; [facility] Nurse/CMA to request most recent records if not in the chart     No diabetic eye exam on file       Click Here for Release of Records Request

## 2025-07-25 ENCOUNTER — PATIENT MESSAGE (OUTPATIENT)
Facility: CLINIC | Age: 45
End: 2025-07-25

## 2025-08-01 ENCOUNTER — OFFICE VISIT (OUTPATIENT)
Facility: CLINIC | Age: 45
End: 2025-08-01
Payer: COMMERCIAL

## 2025-08-01 VITALS
DIASTOLIC BLOOD PRESSURE: 85 MMHG | HEART RATE: 73 BPM | RESPIRATION RATE: 18 BRPM | BODY MASS INDEX: 36.9 KG/M2 | WEIGHT: 303 LBS | SYSTOLIC BLOOD PRESSURE: 128 MMHG | TEMPERATURE: 97.7 F | HEIGHT: 76 IN | OXYGEN SATURATION: 95 %

## 2025-08-01 DIAGNOSIS — E66.01 CLASS 2 SEVERE OBESITY DUE TO EXCESS CALORIES WITH SERIOUS COMORBIDITY AND BODY MASS INDEX (BMI) OF 36.0 TO 36.9 IN ADULT (HCC): ICD-10-CM

## 2025-08-01 DIAGNOSIS — E78.5 HYPERLIPIDEMIA, UNSPECIFIED HYPERLIPIDEMIA TYPE: ICD-10-CM

## 2025-08-01 DIAGNOSIS — E66.812 CLASS 2 SEVERE OBESITY DUE TO EXCESS CALORIES WITH SERIOUS COMORBIDITY AND BODY MASS INDEX (BMI) OF 36.0 TO 36.9 IN ADULT (HCC): ICD-10-CM

## 2025-08-01 DIAGNOSIS — E11.65 UNCONTROLLED TYPE 2 DIABETES MELLITUS WITH HYPERGLYCEMIA (HCC): Primary | ICD-10-CM

## 2025-08-01 DIAGNOSIS — I10 ESSENTIAL (PRIMARY) HYPERTENSION: ICD-10-CM

## 2025-08-01 PROCEDURE — 3046F HEMOGLOBIN A1C LEVEL >9.0%: CPT | Performed by: STUDENT IN AN ORGANIZED HEALTH CARE EDUCATION/TRAINING PROGRAM

## 2025-08-01 PROCEDURE — 3079F DIAST BP 80-89 MM HG: CPT | Performed by: STUDENT IN AN ORGANIZED HEALTH CARE EDUCATION/TRAINING PROGRAM

## 2025-08-01 PROCEDURE — 99214 OFFICE O/P EST MOD 30 MIN: CPT | Performed by: STUDENT IN AN ORGANIZED HEALTH CARE EDUCATION/TRAINING PROGRAM

## 2025-08-01 PROCEDURE — 3074F SYST BP LT 130 MM HG: CPT | Performed by: STUDENT IN AN ORGANIZED HEALTH CARE EDUCATION/TRAINING PROGRAM

## 2025-08-01 ASSESSMENT — ENCOUNTER SYMPTOMS
CONSTIPATION: 0
SHORTNESS OF BREATH: 0
ABDOMINAL PAIN: 0
EYE ITCHING: 0
DIARRHEA: 0
COUGH: 0
VOMITING: 0
VOICE CHANGE: 0
BACK PAIN: 0
EYE DISCHARGE: 0
RHINORRHEA: 0
NAUSEA: 0
SORE THROAT: 0
PHOTOPHOBIA: 0
WHEEZING: 0
TROUBLE SWALLOWING: 0

## 2025-08-01 ASSESSMENT — PATIENT HEALTH QUESTIONNAIRE - PHQ9
1. LITTLE INTEREST OR PLEASURE IN DOING THINGS: NOT AT ALL
SUM OF ALL RESPONSES TO PHQ QUESTIONS 1-9: 0
2. FEELING DOWN, DEPRESSED OR HOPELESS: NOT AT ALL
SUM OF ALL RESPONSES TO PHQ QUESTIONS 1-9: 0

## 2025-08-01 NOTE — PROGRESS NOTES
Ronni Soto (:  1980) is a 45 y.o. male, Established patient, here for evaluation of the following chief complaint(s):  Diabetes and Hypertension         Assessment & Plan  1. Diabetes Mellitus  - Blood glucose levels fluctuating between 160 and 175, with morning readings around 160  - Increase Glargine dosage to 20 units; current weight is 303 lbs, unchanged from last visit  - Increase Zepbound dosage to 5 mg this week; continue metformin 1000 mg twice daily and glipizide  - Monitor blood glucose levels over the next week and report readings  - A1c test to be conducted today    2. Blood pressure management  - Blood pressure well-controlled at 128/85  - Continue current medications: lisinopril 10 mg daily and amlodipine 10 mg daily  - Review of records confirms medication adherence  - Advised to follow DASH diet and Mediterranean diet  - Maintain good diet and exercise regimen    3. Hyperlipidemia  - Lipid panel results: total cholesterol 165, triglycerides 308, LDL 73  - Target LDL for diabetic patients is below 70  - Currently on Lipitor 40 mg  - Lipid panel will be repeated during the next A1c test to monitor progress     Follow-up  - Next scheduled visit in one month    Results  - Labs:    - Blood Glucose Test: 160-175 mg/dL  1. Uncontrolled type 2 diabetes mellitus with hyperglycemia (HCC)  -     tirzepatide-weight management (ZEPBOUND) 5 MG/0.5ML SOAJ subCUTAneous auto-injector pen; Inject 5 mg into the skin every 7 days, Disp-2 mL, R-2Normal  2. Essential (primary) hypertension  3. Hyperlipidemia, unspecified hyperlipidemia type  4. Class 2 severe obesity due to excess calories with serious comorbidity and body mass index (BMI) of 36.0 to 36.9 in adult (HCC)    No follow-ups on file.       Subjective   History of Present Illness  The patient presents for evaluation of diabetes HLD, weight management and blood pressure management.    He reports his blood glucose levels have been fluctuating

## 2025-08-01 NOTE — PROGRESS NOTES
\"Have you been to the ER, urgent care clinic since your last visit?  Hospitalized since your last visit?\"    NO    “Have you seen or consulted any other health care providers outside our system since your last visit?”    NO    “Have you had a diabetic eye exam?”    NO     No diabetic eye exam on file       Click Here for Release of Records Request

## 2025-08-31 DIAGNOSIS — E11.65 UNCONTROLLED TYPE 2 DIABETES MELLITUS WITH HYPERGLYCEMIA (HCC): ICD-10-CM

## 2025-09-01 RX ORDER — GLIPIZIDE 5 MG/1
5 TABLET, FILM COATED, EXTENDED RELEASE ORAL DAILY
Qty: 90 TABLET | Refills: 0 | Status: SHIPPED | OUTPATIENT
Start: 2025-09-01 | End: 2025-11-30